# Patient Record
Sex: FEMALE | Race: WHITE | NOT HISPANIC OR LATINO | Employment: OTHER | URBAN - METROPOLITAN AREA
[De-identification: names, ages, dates, MRNs, and addresses within clinical notes are randomized per-mention and may not be internally consistent; named-entity substitution may affect disease eponyms.]

---

## 2017-04-10 ENCOUNTER — OFFICE VISIT (OUTPATIENT)
Dept: INTERNAL MEDICINE | Facility: CLINIC | Age: 67
End: 2017-04-10

## 2017-04-10 VITALS
HEART RATE: 79 BPM | TEMPERATURE: 98.9 F | HEIGHT: 64 IN | OXYGEN SATURATION: 97 % | WEIGHT: 117.4 LBS | DIASTOLIC BLOOD PRESSURE: 88 MMHG | SYSTOLIC BLOOD PRESSURE: 142 MMHG | BODY MASS INDEX: 20.04 KG/M2

## 2017-04-10 DIAGNOSIS — J20.9 ACUTE BRONCHITIS, UNSPECIFIED ORGANISM: Primary | ICD-10-CM

## 2017-04-10 PROBLEM — M70.60 TROCHANTERIC BURSITIS: Status: ACTIVE | Noted: 2017-02-17

## 2017-04-10 PROCEDURE — 99213 OFFICE O/P EST LOW 20 MIN: CPT | Performed by: INTERNAL MEDICINE

## 2017-04-10 RX ORDER — DOXYCYCLINE HYCLATE 100 MG/1
100 TABLET, DELAYED RELEASE ORAL 2 TIMES DAILY
Qty: 14 TABLET | Refills: 0 | Status: SHIPPED | OUTPATIENT
Start: 2017-04-10 | End: 2017-04-17

## 2017-04-10 RX ORDER — BENZONATATE 100 MG/1
100 CAPSULE ORAL 3 TIMES DAILY PRN
Qty: 30 CAPSULE | Refills: 0 | Status: SHIPPED | OUTPATIENT
Start: 2017-04-10 | End: 2017-04-17

## 2017-04-10 NOTE — PROGRESS NOTES
"Subjective     Palak Berumen is a 66 y.o. female, who presents with a chief complaint of   Chief Complaint   Patient presents with   • Nasal Congestion     all sympt thursday, worried may turn into pna    • Cough     green mucus    • Headache     \"slight\"   • Fever     \"low grade\" over the weekend        HPI Comments: Patient is here with 4 days of congestion and cough that is green. She does not have SOB or CP. She has been taking Mucinex during the day and then NiQuil Cold and Sinus at night. She had had subjective fever, but nothing documented. She had been slightly fatigued, but is still working. The treatment that she has tried has worked well and seems to be helping with cough.        The following portions of the patient's history were reviewed and updated as appropriate: allergies, current medications, past family history, past medical history, past social history, past surgical history and problem list.    Allergies: Review of patient's allergies indicates no known allergies.    Current Outpatient Prescriptions:   •  Ascorbic Acid (VITAMIN C) 100 MG tablet, Take 100 mg by mouth daily., Disp: , Rfl:   •  Calcium Carbonate-Vitamin D (CALCIUM-VITAMIN D) 600-200 MG-UNIT capsule, Take by mouth., Disp: , Rfl:   •  cetirizine (ZyrTEC) 10 MG tablet, Take 10 mg by mouth daily., Disp: , Rfl:   •  GuaiFENesin (MUCUS RELIEF ADULT PO), Take  by mouth., Disp: , Rfl:   •  Multiple Vitamin (MULTIVITAMIN) tablet, Take 1 tablet by mouth daily., Disp: , Rfl:   •  Pseudoeph-Doxylamine-DM-APAP (NYQUIL PO), Take  by mouth., Disp: , Rfl:   •  raloxifene (EVISTA) 60 MG tablet, Take 1 tablet (60 mg total) by mouth daily. Take one tablet daily as directed, Disp: 30 tablet, Rfl: 12  •  ZOLMitriptan (ZOMIG) 5 MG tablet, Take one tablet po at onset of migraine.  May repeat once after 2 hours.  Max 10mg/day, Disp: 12 tablet, Rfl: 6  •  benzonatate (TESSALON PERLES) 100 MG capsule, Take 1 capsule by mouth 3 (Three) Times a Day As " "Needed for Cough for up to 7 days., Disp: 30 capsule, Rfl: 0  •  doxycycline (DORYX) 100 MG enteric coated tablet, Take 1 tablet by mouth 2 (Two) Times a Day for 7 days., Disp: 14 tablet, Rfl: 0  There are no discontinued medications.    Review of Systems   Constitutional: Positive for chills, fatigue and fever.   HENT: Positive for congestion.    Respiratory: Positive for cough. Negative for shortness of breath and wheezing.    Cardiovascular: Negative for chest pain and palpitations.   Gastrointestinal: Negative for diarrhea and nausea.       Objective     /88 (BP Location: Left arm, Patient Position: Sitting, Cuff Size: Adult)  Pulse 79  Temp 98.9 °F (37.2 °C) (Oral)   Ht 64\" (162.6 cm)  Wt 117 lb 6.4 oz (53.3 kg)  SpO2 97%  BMI 20.15 kg/m2      Physical Exam   Constitutional: She is oriented to person, place, and time. She appears well-developed and well-nourished. No distress.   HENT:   Head: Normocephalic and atraumatic.   Right Ear: Tympanic membrane and external ear normal.   Left Ear: Tympanic membrane and external ear normal.   Mouth/Throat: Oropharynx is clear and moist and mucous membranes are normal. No oropharyngeal exudate. Tonsils are 0 on the right. Tonsils are 0 on the left. No tonsillar exudate.   Eyes: Conjunctivae are normal. Right eye exhibits no discharge. Left eye exhibits no discharge. No scleral icterus.   Neck: Neck supple.   Cardiovascular: Normal rate, regular rhythm and normal heart sounds.  Exam reveals no gallop and no friction rub.    No murmur heard.  Pulmonary/Chest: Effort normal and breath sounds normal. No respiratory distress. She has no wheezes. She has no rales.   Lymphadenopathy:     She has no cervical adenopathy.   Neurological: She is alert and oriented to person, place, and time.   Skin: Skin is warm. No rash noted.   Psychiatric: She has a normal mood and affect. Her behavior is normal.   Nursing note and vitals reviewed.      Results for orders placed or " "performed during the hospital encounter of 10/17/16   Tissue Exam   Result Value Ref Range    Case Report       Surgical Pathology Report                         Case: [id number]                                  Authorizing Provider:  Anny Salamanca MD          Collected:           10/17/2016 10:21 AM          Ordering Location:     [facility]  Received:            10/17/2016 11:32 AM                                 ENDO SUITES                                                                  Pathologist:           Levy Peck MD                                                          Specimen:    Large Intestine, Right / Ascending Colon, ASCENDING COLON POLYP                            Final Diagnosis       COLON, ASCENDING, POLYPECTOMY:         TUBULAR ADENOMA.         ONLY LOW-GRADE DYSPLASIA IS IDENTIFIED.    TDJ/jse     CPT CODES:  1: 52865      Gross Description       Received in formalin labeled \"large intestine, right/ascending colon polyp\" is a 0.5 x 0.2 x 0.1 cm tan piece of tissue.  Submitted all in block 1A.     CC/USO/THM/jse       Microscopic Description       Performed, incorporated in diagnosis.       Embedded Images         Assessment/Plan   Palak was seen today for nasal congestion, cough, headache and fever.    Diagnoses and all orders for this visit:    Acute bronchitis, unspecified organism    Other orders  -     doxycycline (DORYX) 100 MG enteric coated tablet; Take 1 tablet by mouth 2 (Two) Times a Day for 7 days.  -     benzonatate (TESSALON PERLES) 100 MG capsule; Take 1 capsule by mouth 3 (Three) Times a Day As Needed for Cough for up to 7 days.    Will treat with doxycyline and tessalon pearls and will continue Mucinex during day for secretions.  Will return if symptoms worsen or has development of fever, chills., SOB or increased work of breathing.       Return if symptoms worsen or fail to improve.    Kait Marino MD  04/10/2017    "

## 2017-05-17 DIAGNOSIS — Z00.00 HEALTHCARE MAINTENANCE: Primary | ICD-10-CM

## 2017-06-07 ENCOUNTER — OFFICE VISIT (OUTPATIENT)
Dept: INTERNAL MEDICINE | Facility: CLINIC | Age: 67
End: 2017-06-07

## 2017-06-07 VITALS
WEIGHT: 116.2 LBS | HEART RATE: 61 BPM | OXYGEN SATURATION: 98 % | BODY MASS INDEX: 19.84 KG/M2 | HEIGHT: 64 IN | DIASTOLIC BLOOD PRESSURE: 60 MMHG | SYSTOLIC BLOOD PRESSURE: 122 MMHG

## 2017-06-07 DIAGNOSIS — Z00.00 PHYSICAL EXAM: Primary | ICD-10-CM

## 2017-06-07 LAB
BILIRUB BLD-MCNC: NEGATIVE MG/DL
CLARITY, POC: CLEAR
COLOR UR: YELLOW
GLUCOSE UR STRIP-MCNC: NEGATIVE MG/DL
KETONES UR QL: NEGATIVE
LEUKOCYTE EST, POC: NEGATIVE
NITRITE UR-MCNC: NEGATIVE MG/ML
PH UR: 7.5 [PH] (ref 5–8)
PROT UR STRIP-MCNC: NEGATIVE MG/DL
RBC # UR STRIP: NEGATIVE /UL
SP GR UR: 1.01 (ref 1–1.03)
UROBILINOGEN UR QL: NORMAL

## 2017-06-07 PROCEDURE — 81003 URINALYSIS AUTO W/O SCOPE: CPT | Performed by: INTERNAL MEDICINE

## 2017-06-07 PROCEDURE — 93000 ELECTROCARDIOGRAM COMPLETE: CPT | Performed by: INTERNAL MEDICINE

## 2017-06-07 PROCEDURE — 99397 PER PM REEVAL EST PAT 65+ YR: CPT | Performed by: INTERNAL MEDICINE

## 2017-06-07 NOTE — PROGRESS NOTES
"Subjective   Palak Berumen is a 67 y.o. female.     History of Present Illness   68 yo female with pmhx migraine, only having periodic migraines, using zomig.  At her baseline.  She is feeling pretty well, only occasional zyrtec.  Using nasal washer for her sinuses.    Has known osteoporosis. Using her calcium and vitamin D. Walking regularly. Not taking evista.     Did have ortho appt 2/17 - had trochanteric bursitis.  The following portions of the patient's history were reviewed and updated as appropriate: allergies, current medications, past family history, past medical history, past social history, past surgical history and problem list.    Review of Systems   Constitutional: Negative.  Negative for fatigue and fever.   HENT: Negative for congestion, postnasal drip, rhinorrhea, sore throat, trouble swallowing and voice change.         Eye exam last year.  Will arrange   Respiratory: Negative.  Negative for cough.    Cardiovascular: Negative.    Gastrointestinal: Negative for blood in stool, constipation and diarrhea.        Regular stools, colonoscopy every 2-3 years   Endocrine: Negative.    Genitourinary: Negative.    Musculoskeletal: Negative.    Neurological: Positive for headaches.        At her baseline   All other systems reviewed and are negative.      Objective   Physical Exam  /60 (BP Location: Right arm, Patient Position: Sitting, Cuff Size: Adult)  Pulse 61  Ht 64\" (162.6 cm)  Wt 116 lb 3.2 oz (52.7 kg)  SpO2 98%  BMI 19.95 kg/m2    General Appearance:  Alert, cooperative, no distress, appears stated age   Head:  Normocephalic, without obvious abnormality, atraumatic   Eyes:  PERRL, conjunctiva/corneas clear, EOM's intact, fundi benign, both eyes   Ears:  Normal TM's and external ear canals, both ears   Nose: Nares normal, septum midline,mucosa normal, no drainage or sinus tenderness   Throat: Lips, mucosa, and tongue normal; teeth and gums normal   Neck: Supple, symmetrical, trachea " midline, no adenopathy;  thyroid: not enlarged, symmetric, no tenderness/mass/nodules; no carotid bruit or JVD   Back:   Symmetric, no curvature, ROM normal, no CVA tenderness   Lungs:   Clear to auscultation bilaterally, respirations unlabored   Breasts:  No masses or tenderness   Heart:  Regular rate and rhythm, S1 and S2 normal, no murmur, rub, or gallop   Abdomen:   Soft, non-tender, bowel sounds active all four quadrants,  no masses, no organomegaly   Pelvic: Deferred   Extremities: Extremities normal, atraumatic, no cyanosis or edema   Pulses: 2+ and symmetric   Skin: Skin color, texture, turgor normal, no rashes or lesions   Lymph nodes: Cervical, supraclavicular, and axillary nodes normal   Neurologic: Normal     ECG 12 Lead  Date/Time: 6/7/2017 1:14 PM  Performed by: RADHA TREVINO  Authorized by: RADHA TREVINO   Rhythm: sinus rhythm  Rate: normal  BPM: 60  ST Segments: ST segments normal  T Waves: T waves normal  QRS axis: normal  Other: no other findings  Clinical impression: normal ECG        UA clear today      Assessment/Plan   Palak was seen today for annual exam.    Diagnoses and all orders for this visit:    Physical exam  -     POCT urinalysis dipstick, automated  -     ECG 12 Lead  -     ECG 12 Lead        Labs done at [facility], call once get results.   Recommend glaucoma screening annually  GYN for dexa  S/p mastectomy - oncology at [place]   Colonoscopy with Dr. Salamanca - avoiding red meats  Continue healthy diet and exercise  Recommend prevnar at some point. She declines today  Shingles up to date

## 2017-12-13 DIAGNOSIS — G43.009 MIGRAINE WITHOUT AURA AND WITHOUT STATUS MIGRAINOSUS, NOT INTRACTABLE: ICD-10-CM

## 2017-12-13 RX ORDER — ZOLMITRIPTAN 5 MG/1
TABLET, FILM COATED ORAL
Qty: 12 TABLET | Refills: 5 | Status: SHIPPED | OUTPATIENT
Start: 2017-12-13 | End: 2018-08-25 | Stop reason: SDUPTHER

## 2018-01-26 ENCOUNTER — OFFICE VISIT (OUTPATIENT)
Dept: INTERNAL MEDICINE | Facility: CLINIC | Age: 68
End: 2018-01-26

## 2018-01-26 VITALS
HEIGHT: 64 IN | BODY MASS INDEX: 20.14 KG/M2 | DIASTOLIC BLOOD PRESSURE: 76 MMHG | WEIGHT: 118 LBS | SYSTOLIC BLOOD PRESSURE: 140 MMHG | HEART RATE: 83 BPM | OXYGEN SATURATION: 98 % | TEMPERATURE: 97.8 F

## 2018-01-26 DIAGNOSIS — J20.9 ACUTE BRONCHITIS, UNSPECIFIED ORGANISM: Primary | ICD-10-CM

## 2018-01-26 DIAGNOSIS — R09.81 NASAL CONGESTION: ICD-10-CM

## 2018-01-26 LAB
EXPIRATION DATE: NORMAL
FLUAV AG NPH QL: NORMAL
FLUBV AG NPH QL: NORMAL
INTERNAL CONTROL: NORMAL
Lab: NORMAL

## 2018-01-26 PROCEDURE — 99213 OFFICE O/P EST LOW 20 MIN: CPT | Performed by: INTERNAL MEDICINE

## 2018-01-26 PROCEDURE — 87804 INFLUENZA ASSAY W/OPTIC: CPT | Performed by: INTERNAL MEDICINE

## 2018-01-26 RX ORDER — AZITHROMYCIN 250 MG/1
TABLET, FILM COATED ORAL
Qty: 6 TABLET | Refills: 0 | Status: SHIPPED | OUTPATIENT
Start: 2018-01-26 | End: 2018-06-13

## 2018-01-26 NOTE — PROGRESS NOTES
Subjective     Palak Berumen is a 67 y.o. female, who presents with a chief complaint of   Chief Complaint   Patient presents with   • Nasal Congestion     all symptoms 5 x days    • Cough       HPI Comments: 66 yo F here with 5 days of chest tightness and cough. Cough is dry. No fever. Nasal congestion just started today. Scratchy throat that started today. Ear are feeling fine. Sick contacts at home. She has taken Mucinex that has helped.        The following portions of the patient's history were reviewed and updated as appropriate: allergies, current medications, past family history, past medical history, past social history, past surgical history and problem list.    Allergies: Review of patient's allergies indicates no known allergies.    Current Outpatient Prescriptions:   •  Ascorbic Acid (VITAMIN C) 100 MG tablet, Take 100 mg by mouth daily., Disp: , Rfl:   •  Calcium Carbonate-Vitamin D (CALCIUM-VITAMIN D) 600-200 MG-UNIT capsule, Take by mouth., Disp: , Rfl:   •  cetirizine (ZyrTEC) 10 MG tablet, Take 10 mg by mouth daily., Disp: , Rfl:   •  Fluticasone Furoate-Vilanterol 100-25 MCG/INH aerosol powder , Inhale 1 puff Daily., Disp: , Rfl:   •  GuaiFENesin (MUCUS RELIEF ADULT PO), Take  by mouth., Disp: , Rfl:   •  Multiple Vitamin (MULTIVITAMIN) tablet, Take 1 tablet by mouth daily., Disp: , Rfl:   •  Pseudoeph-Doxylamine-DM-APAP (NYQUIL PO), Take  by mouth., Disp: , Rfl:   •  ZOLMitriptan (ZOMIG) 5 MG tablet, TAKE ONE TABLET BY MOUTH  AT ONSET OF MIGRAINE MAY REPEAT ONCE AFTER 2 HOURS MAX 2 TABLETS (10 MG), Disp: 12 tablet, Rfl: 5  •  azithromycin (ZITHROMAX Z-PARTH) 250 MG tablet, Take 2 tablets the first day, then 1 tablet daily for 4 days., Disp: 6 tablet, Rfl: 0  There are no discontinued medications.    Review of Systems   Constitutional: Positive for chills and fatigue. Negative for fever.   HENT: Positive for congestion.    Respiratory: Positive for cough. Negative for shortness of breath and  "wheezing.    Neurological: Negative for dizziness and headaches.       Objective     /76 (BP Location: Right arm, Patient Position: Sitting, Cuff Size: Adult)  Pulse 83  Temp 97.8 °F (36.6 °C) (Oral)   Ht 162.6 cm (64.02\")  Wt 53.5 kg (118 lb)  SpO2 98%  BMI 20.24 kg/m2      Physical Exam   Constitutional: She is oriented to person, place, and time. She appears well-developed and well-nourished. No distress.   HENT:   Head: Normocephalic and atraumatic.   Right Ear: Hearing, tympanic membrane and external ear normal.   Left Ear: Hearing, tympanic membrane and external ear normal.   Nose: Nose normal. Right sinus exhibits no frontal sinus tenderness. Left sinus exhibits no frontal sinus tenderness.   Mouth/Throat: Uvula is midline and mucous membranes are normal. Posterior oropharyngeal edema and posterior oropharyngeal erythema present. No oropharyngeal exudate. Tonsils are 0 on the right. Tonsils are 0 on the left. No tonsillar exudate.   Eyes: Conjunctivae are normal. Right eye exhibits no discharge. Left eye exhibits no discharge. No scleral icterus.   Neck: Neck supple.   Cardiovascular: Normal rate, regular rhythm and normal heart sounds.  Exam reveals no gallop and no friction rub.    No murmur heard.  Pulmonary/Chest: Effort normal and breath sounds normal. No respiratory distress. She has no wheezes. She has no rales.   Lymphadenopathy:     She has no cervical adenopathy.   Neurological: She is alert and oriented to person, place, and time.   Skin: Skin is warm. No rash noted.   Psychiatric: She has a normal mood and affect. Her behavior is normal.   Nursing note and vitals reviewed.      Results for orders placed or performed in visit on 01/26/18   POC Influenza A / B   Result Value Ref Range    Rapid Influenza A Ag neg     Rapid Influenza B Ag neg     Internal Control Passed Passed    Lot Number [id number]     Expiration Date 12/06/2020        Assessment/Plan   Palak was seen today for nasal " congestion and cough.    Diagnoses and all orders for this visit:    Acute bronchitis, unspecified organism    Nasal congestion  -     POC Influenza A / B    Other orders  -     azithromycin (ZITHROMAX Z-PARTH) 250 MG tablet; Take 2 tablets the first day, then 1 tablet daily for 4 days.      Breo as well as Z-pack for cough   Mucinex for secretions   Return if fever, lethargy or worsening symptoms  Return if symptoms worsen or fail to improve.    Kait Marino MD  01/26/2018

## 2018-05-21 DIAGNOSIS — M81.0 OSTEOPOROSIS, UNSPECIFIED OSTEOPOROSIS TYPE, UNSPECIFIED PATHOLOGICAL FRACTURE PRESENCE: Primary | ICD-10-CM

## 2018-05-21 DIAGNOSIS — Z00.00 ROUTINE ADULT HEALTH MAINTENANCE: ICD-10-CM

## 2018-05-21 DIAGNOSIS — Z00.00 PHYSICAL EXAM: ICD-10-CM

## 2018-05-26 ENCOUNTER — RESULTS ENCOUNTER (OUTPATIENT)
Dept: INTERNAL MEDICINE | Facility: CLINIC | Age: 68
End: 2018-05-26

## 2018-05-26 DIAGNOSIS — M81.0 OSTEOPOROSIS, UNSPECIFIED OSTEOPOROSIS TYPE, UNSPECIFIED PATHOLOGICAL FRACTURE PRESENCE: ICD-10-CM

## 2018-05-26 DIAGNOSIS — Z00.00 PHYSICAL EXAM: ICD-10-CM

## 2018-05-26 DIAGNOSIS — Z00.00 ROUTINE ADULT HEALTH MAINTENANCE: ICD-10-CM

## 2018-06-11 ENCOUNTER — TELEPHONE (OUTPATIENT)
Dept: INTERNAL MEDICINE | Facility: CLINIC | Age: 68
End: 2018-06-11

## 2018-06-11 NOTE — TELEPHONE ENCOUNTER
Patient has appt on 6/13/18 to review with dr. constantino     ----- Message from Yoshi Constantino MD sent at 6/7/2018  2:27 PM EDT -----  Been over a year since been seen.  Have [facility] here. She would need to come in for review.

## 2018-06-13 ENCOUNTER — OFFICE VISIT (OUTPATIENT)
Dept: INTERNAL MEDICINE | Facility: CLINIC | Age: 68
End: 2018-06-13

## 2018-06-13 VITALS
HEART RATE: 79 BPM | SYSTOLIC BLOOD PRESSURE: 134 MMHG | HEIGHT: 64 IN | BODY MASS INDEX: 20.11 KG/M2 | WEIGHT: 117.8 LBS | RESPIRATION RATE: 14 BRPM | OXYGEN SATURATION: 99 % | DIASTOLIC BLOOD PRESSURE: 74 MMHG

## 2018-06-13 DIAGNOSIS — Z00.00 ROUTINE ADULT HEALTH MAINTENANCE: Primary | ICD-10-CM

## 2018-06-13 LAB
BILIRUB BLD-MCNC: NEGATIVE MG/DL
CLARITY, POC: CLEAR
COLOR UR: YELLOW
GLUCOSE UR STRIP-MCNC: NEGATIVE MG/DL
KETONES UR QL: NEGATIVE
LEUKOCYTE EST, POC: NEGATIVE
NITRITE UR-MCNC: NEGATIVE MG/ML
PH UR: 7 [PH] (ref 5–8)
PROT UR STRIP-MCNC: NEGATIVE MG/DL
RBC # UR STRIP: NEGATIVE /UL
SP GR UR: 1.01 (ref 1–1.03)
UROBILINOGEN UR QL: NORMAL

## 2018-06-13 PROCEDURE — 99397 PER PM REEVAL EST PAT 65+ YR: CPT | Performed by: INTERNAL MEDICINE

## 2018-06-13 PROCEDURE — 81003 URINALYSIS AUTO W/O SCOPE: CPT | Performed by: INTERNAL MEDICINE

## 2018-06-13 NOTE — PROGRESS NOTES
Palak Berumen is a 68 y.o. female, who presents with a chief complaint of   Chief Complaint   Patient presents with   • Annual Exam       HPI   69 yo female with pmhx mastectomy   The following portions of the patient's history were reviewed and updated as appropriate: allergies, current medications, past family history, past medical history, past social history, past surgical history and problem list.    Allergies: Patient has no known allergies.    Current Outpatient Prescriptions:   •  Ascorbic Acid (VITAMIN C) 100 MG tablet, Take 100 mg by mouth daily., Disp: , Rfl:   •  Calcium Carbonate-Vitamin D (CALCIUM-VITAMIN D) 600-200 MG-UNIT capsule, Take by mouth., Disp: , Rfl:   •  GuaiFENesin (MUCUS RELIEF ADULT PO), Take  by mouth., Disp: , Rfl:   •  Multiple Vitamin (MULTIVITAMIN) tablet, Take 1 tablet by mouth daily., Disp: , Rfl:   •  ZOLMitriptan (ZOMIG) 5 MG tablet, TAKE ONE TABLET BY MOUTH  AT ONSET OF MIGRAINE MAY REPEAT ONCE AFTER 2 HOURS MAX 2 TABLETS (10 MG), Disp: 12 tablet, Rfl: 5  Medications Discontinued During This Encounter   Medication Reason   • azithromycin (ZITHROMAX Z-PARTH) 250 MG tablet *Therapy completed   • Pseudoeph-Doxylamine-DM-APAP (NYQUIL PO) *Therapy completed   • cetirizine (ZyrTEC) 10 MG tablet *Therapy completed   • Fluticasone Furoate-Vilanterol 100-25 MCG/INH aerosol powder  *Therapy completed       Review of Systems   Constitutional: Negative for appetite change, fatigue, fever and unexpected weight change.   HENT: Negative for sinus pressure and trouble swallowing.    Respiratory: Negative.  Negative for cough and chest tightness.    Cardiovascular: Negative for chest pain, palpitations and leg swelling.   Gastrointestinal: Negative for constipation and diarrhea.   Genitourinary: Negative.  Negative for dysuria, frequency, hematuria, pelvic pain and vaginal discharge.   Musculoskeletal: Negative.    Skin: Negative.    Neurological: Positive for headaches. Negative for  "dizziness and light-headedness.        More than 5 per month.    Hematological: Negative.    Psychiatric/Behavioral: Negative.    All other systems reviewed and are negative.            /74   Pulse 79   Resp 14   Ht 162.6 cm (64\")   Wt 53.4 kg (117 lb 12.8 oz)   SpO2 99%   BMI 20.22 kg/m²       Physical Exam   Constitutional: She is oriented to person, place, and time. She appears well-developed and well-nourished.   HENT:   Head: Normocephalic and atraumatic.   Right Ear: External ear normal.   Left Ear: External ear normal.   Eyes: EOM are normal. Pupils are equal, round, and reactive to light.   Neck: Normal range of motion. Neck supple. No tracheal deviation present. No thyromegaly present.   Cardiovascular: Normal rate, regular rhythm and normal heart sounds.  Exam reveals no friction rub.    No murmur heard.  Pulmonary/Chest: Effort normal and breath sounds normal.   Neurological: She is alert and oriented to person, place, and time.   Skin: Skin is warm and dry.   Psychiatric: She has a normal mood and affect. Her behavior is normal.   Vitals reviewed.      Lab Results (most recent)     Procedure Component Value Units Date/Time    POC Urinalysis Dipstick, Automated [[id number]] Collected:  06/13/18 0906    Specimen:  Urine Updated:  06/13/18 0906     Color Yellow     Clarity, UA Clear     Specific Gravity  1.010     pH, Urine 7.0     Leukocytes Negative     Nitrite, UA Negative     Protein, POC Negative mg/dL      Glucose, UA Negative mg/dL      Ketones, UA Negative     Urobilinogen, UA Normal     Bilirubin Negative     Blood, UA Negative          Results for orders placed or performed in visit on 06/13/18   POC Urinalysis Dipstick, Automated   Result Value Ref Range    Color Yellow Yellow, Straw, Dark Yellow, Marci    Clarity, UA Clear Clear    Specific Gravity  1.010 1.005 - 1.030    pH, Urine 7.0 5.0 - 8.0    Leukocytes Negative Negative    Nitrite, UA Negative Negative    Protein, POC Negative " Negative mg/dL    Glucose, UA Negative Negative, 1000 mg/dL (3+) mg/dL    Ketones, UA Negative Negative    Urobilinogen, UA Normal Normal    Bilirubin Negative Negative    Blood, UA Negative Negative           Palka was seen today for annual exam.    Diagnoses and all orders for this visit:    Routine adult health maintenance  -     POC Urinalysis Dipstick, Automated  -     Comprehensive metabolic panel  -     Lipid Panel w/ Chol/HDL Ratio  -     Hemoglobin A1c  -     TSH  -     CBC w AUTO Differential  -     Hepatitis C antibody    Labs done at [facility] and review with patient, add hep c antigen next time she does labs. I did not realize she had [facility] labs already.    Colonic polyps. Last colonoscopy 10/2016.  Patient remembers endoscopy every 2 years. She prefers to have more frequent scopes.   GYn for dexa, discussed osteoporosis treatment, consider evista if an option with her.   CLL stable, followed with Dr. Zamora.   Migraines are uncontrolled, using zomig regularly more than 5 per month. Discussed her goal is <2 per month. Offered losartan or propranolol  Recommend flonase daily and daily allegra.     Return in about 1 year (around 6/13/2019).    Yoshi Constantino MD  06/13/2018

## 2018-06-13 NOTE — PATIENT INSTRUCTIONS
Palak was seen today for annual exam.    Diagnoses and all orders for this visit:    Routine adult health maintenance  -     POC Urinalysis Dipstick, Automated  -     Comprehensive metabolic panel  -     Lipid Panel w/ Chol/HDL Ratio  -     Hemoglobin A1c  -     TSH  -     CBC w AUTO Differential  -     Hepatitis C antibody    Labs done at [facility] and review with patient, add hep c antigen next time she does labs. I did not realize she had [facility] labs already.    Colonic polyps. Last colonoscopy 10/2016.  Patient remembers endoscopy every 2 years. She prefers to have more frequent scopes.   GYn for dexa, discussed osteoporosis treatment, consider evista if an option with her.   CLL stable, followed with Dr. Zamora.   Migraines are uncontrolled, using zomig regularly more than 5 per month. Discussed her goal is <2 per month. Offered losartan or propranolol  Recommend flonase daily and daily allegra.     Return in about 1 year (around 6/13/2019).    Yoshi Constantino MD  06/13/2018

## 2018-08-25 DIAGNOSIS — G43.009 MIGRAINE WITHOUT AURA AND WITHOUT STATUS MIGRAINOSUS, NOT INTRACTABLE: ICD-10-CM

## 2018-08-27 RX ORDER — ZOLMITRIPTAN 5 MG/1
TABLET, FILM COATED ORAL
Qty: 12 TABLET | Refills: 4 | Status: SHIPPED | OUTPATIENT
Start: 2018-08-27 | End: 2019-06-20 | Stop reason: SDUPTHER

## 2019-06-04 ENCOUNTER — TELEPHONE (OUTPATIENT)
Dept: INTERNAL MEDICINE | Facility: CLINIC | Age: 69
End: 2019-06-04

## 2019-06-04 DIAGNOSIS — Z11.59 NEED FOR HEPATITIS C SCREENING TEST: ICD-10-CM

## 2019-06-04 DIAGNOSIS — Z00.00 ROUTINE ADULT HEALTH MAINTENANCE: Primary | ICD-10-CM

## 2019-06-04 DIAGNOSIS — C91.10 CHRONIC LYMPHOCYTIC LEUKEMIA (HCC): ICD-10-CM

## 2019-06-04 NOTE — TELEPHONE ENCOUNTER
I left a voice mail for the patient to let her know that the lab orders are up front in the file for her to .      ----- Message from Dawn Callejas CMA sent at 6/4/2019  2:31 PM EDT -----  Regarding: RE: LAB ORDERS PLEASE  Contact: [phone]  Ready to print out.   Please print for patient and tell her they are ready.     ----- Message -----  From: Kait Marino MD  Sent: 6/4/2019   9:37 AM  To: Dawn Callejas CMA  Subject: RE: LAB ORDERS PLEASE                            CBC, CMP, TSH with reflux, Hep C ab, lipid, urine, and vitamin D   ----- Message -----  From: Dawn Callejas CMA  Sent: 6/3/2019   2:10 PM  To: Kait Marino MD  Subject: RE: LAB ORDERS PLEASE                            [facility] transfer, what labs would you like for her to have?   Thank you     ----- Message -----  From: Marielena Shook MA  Sent: 5/29/2019   4:12 PM  To: Dawn Callejas CMA  Subject: FW: LAB ORDERS PLEASE                                ----- Message -----  From: Iris Berrios  Sent: 5/29/2019   2:40 PM  To: Jayden Patel [facility]  Subject: LAB ORDERS PLEASE                                [facility] PT / prev trinity    Patient called to say that she needs to have her lab orders printed so that she can pick them up to take to the lab where she has to have her blood drawn.  Please call her when these are ready for her to .  She has an appointment scheduled for 06/20    Thanks!  Iris

## 2019-06-20 ENCOUNTER — OFFICE VISIT (OUTPATIENT)
Dept: INTERNAL MEDICINE | Facility: CLINIC | Age: 69
End: 2019-06-20

## 2019-06-20 VITALS
OXYGEN SATURATION: 98 % | DIASTOLIC BLOOD PRESSURE: 68 MMHG | WEIGHT: 113 LBS | BODY MASS INDEX: 19.29 KG/M2 | TEMPERATURE: 98.2 F | RESPIRATION RATE: 14 BRPM | HEART RATE: 76 BPM | HEIGHT: 64 IN | SYSTOLIC BLOOD PRESSURE: 140 MMHG

## 2019-06-20 DIAGNOSIS — K63.5 POLYP OF COLON, UNSPECIFIED PART OF COLON, UNSPECIFIED TYPE: ICD-10-CM

## 2019-06-20 DIAGNOSIS — C91.10 CHRONIC LYMPHOCYTIC LEUKEMIA (HCC): Primary | ICD-10-CM

## 2019-06-20 DIAGNOSIS — R03.0 ELEVATED BLOOD PRESSURE READING: ICD-10-CM

## 2019-06-20 DIAGNOSIS — E78.5 HYPERLIPIDEMIA, UNSPECIFIED HYPERLIPIDEMIA TYPE: ICD-10-CM

## 2019-06-20 DIAGNOSIS — G43.009 MIGRAINE WITHOUT AURA AND WITHOUT STATUS MIGRAINOSUS, NOT INTRACTABLE: ICD-10-CM

## 2019-06-20 DIAGNOSIS — G43.009 ATYPICAL MIGRAINE: ICD-10-CM

## 2019-06-20 PROBLEM — M70.60 TROCHANTERIC BURSITIS: Status: RESOLVED | Noted: 2017-02-17 | Resolved: 2019-06-20

## 2019-06-20 PROBLEM — Z00.00 ROUTINE ADULT HEALTH MAINTENANCE: Status: RESOLVED | Noted: 2017-06-07 | Resolved: 2019-06-20

## 2019-06-20 PROBLEM — Z00.00 PHYSICAL EXAM: Status: RESOLVED | Noted: 2017-06-07 | Resolved: 2019-06-20

## 2019-06-20 PROCEDURE — 99214 OFFICE O/P EST MOD 30 MIN: CPT | Performed by: INTERNAL MEDICINE

## 2019-06-20 RX ORDER — CLOBETASOL PROPIONATE 0.5 MG/G
OINTMENT TOPICAL
COMMUNITY
Start: 2019-04-09 | End: 2019-12-18

## 2019-06-20 RX ORDER — ZOLMITRIPTAN 5 MG/1
TABLET, FILM COATED ORAL
Qty: 12 TABLET | Refills: 4 | Status: SHIPPED | OUTPATIENT
Start: 2019-06-20 | End: 2020-08-24

## 2019-06-20 NOTE — PROGRESS NOTES
Palak Berumen is a 69 y.o. female, who presents with a chief complaint of   Chief Complaint   Patient presents with   • Follow-up     6 + x month f/u, lab results, [facility] transfer   • Leukemia       68 yo F here to establish care. All of her problems are new to me. She is seeing Dr. Fonseca's at [place] for CLL. Feeling well and is stable and just gets blood work checked once per year.     She has chronic migraines and seems to flair with weather changes. Allergies also trigger these. Takes Zomig and it makes it better. Takes about a few times per month.     She had double mastectomy 29 years ago and does not get mammograms due to this. Followed by Dr. Marycarmen Ortega.     I reviewed her blood work which all looks great. Her LDL was slightly elevated, but is not terrible. Eats well and exercises.     She had had elevated blood pressure readings in the past. I reviewed the last four in the system and all were high. States always high at doctor's office. No headache or dizziness. Never been on treatment.          The following portions of the patient's history were reviewed and updated as appropriate: allergies, current medications, past family history, past medical history, past social history, past surgical history and problem list.    Allergies: Patient has no known allergies.    Current Outpatient Medications:   •  Ascorbic Acid (VITAMIN C) 100 MG tablet, Take 100 mg by mouth daily., Disp: , Rfl:   •  Calcium Carbonate-Vitamin D (CALCIUM-VITAMIN D) 600-200 MG-UNIT capsule, Take by mouth., Disp: , Rfl:   •  clobetasol (TEMOVATE) 0.05 % ointment, , Disp: , Rfl:   •  GuaiFENesin (MUCUS RELIEF ADULT PO), Take  by mouth., Disp: , Rfl:   •  Multiple Vitamin (MULTIVITAMIN) tablet, Take 1 tablet by mouth daily., Disp: , Rfl:   •  ZOLMitriptan (ZOMIG) 5 MG tablet, Take one tablet by mouth at onset of migraine and may repeat once after 2 hours if needed (max dose 10mg), Disp: 12 tablet, Rfl: 4  Medications  "Discontinued During This Encounter   Medication Reason   • ZOLMitriptan (ZOMIG) 5 MG tablet Reorder       Review of Systems   Constitutional: Negative for chills, fatigue and fever.   HENT: Negative for congestion and rhinorrhea.    Respiratory: Negative for cough and shortness of breath.    Cardiovascular: Negative for chest pain and palpitations.   Gastrointestinal: Negative for abdominal pain, constipation and diarrhea.   Endocrine: Negative for cold intolerance and heat intolerance.   Genitourinary: Negative for difficulty urinating and dysuria.   Allergic/Immunologic: Negative for environmental allergies.   Neurological: Positive for headaches.             /68   Pulse 76   Temp 98.2 °F (36.8 °C) (Oral)   Resp 14   Ht 162.6 cm (64\")   Wt 51.3 kg (113 lb)   SpO2 98%   BMI 19.40 kg/m²       Physical Exam   Constitutional: She is oriented to person, place, and time. She appears well-developed and well-nourished. No distress.   HENT:   Head: Normocephalic and atraumatic.   Right Ear: Hearing, tympanic membrane, external ear and ear canal normal.   Left Ear: Hearing, tympanic membrane, external ear and ear canal normal.   Nose: Nose normal.   Mouth/Throat: Uvula is midline, oropharynx is clear and moist and mucous membranes are normal. No oropharyngeal exudate. Tonsils are 0 on the right. Tonsils are 0 on the left. No tonsillar exudate.   Eyes: Conjunctivae are normal. Right eye exhibits no discharge. Left eye exhibits no discharge. No scleral icterus.   Neck: Neck supple.   Cardiovascular: Normal rate, regular rhythm and normal heart sounds. Exam reveals no gallop and no friction rub.   No murmur heard.  Pulmonary/Chest: Effort normal and breath sounds normal. No respiratory distress. She has no wheezes. She has no rales.   Abdominal: Soft. Bowel sounds are normal. She exhibits no distension and no mass. There is no tenderness. There is no guarding.   Lymphadenopathy:     She has no cervical adenopathy. "   Neurological: She is alert and oriented to person, place, and time.   Skin: Skin is warm. No rash noted.   Psychiatric: She has a normal mood and affect. Her behavior is normal.   Nursing note and vitals reviewed.        Results for orders placed or performed in visit on 06/13/18   POC Urinalysis Dipstick, Automated   Result Value Ref Range    Color Yellow Yellow, Straw, Dark Yellow, Marci    Clarity, UA Clear Clear    Specific Gravity  1.010 1.005 - 1.030    pH, Urine 7.0 5.0 - 8.0    Leukocytes Negative Negative    Nitrite, UA Negative Negative    Protein, POC Negative Negative mg/dL    Glucose, UA Negative Negative, 1000 mg/dL (3+) mg/dL    Ketones, UA Negative Negative    Urobilinogen, UA Normal Normal    Bilirubin Negative Negative    Blood, UA Negative Negative           Palak was seen today for follow-up and leukemia.    Diagnoses and all orders for this visit:    Chronic lymphocytic leukemia (CMS/HCC)    Atypical migraine    Hyperlipidemia, unspecified hyperlipidemia type    Migraine without aura and without status migrainosus, not intractable  -     ZOLMitriptan (ZOMIG) 5 MG tablet; Take one tablet by mouth at onset of migraine and may repeat once after 2 hours if needed (max dose 10mg)    Polyp of colon, unspecified part of colon, unspecified type  -     Ambulatory Referral For Screening Colonoscopy    Elevated blood pressure reading      CLL is stable and reviewed labs. Keep follow up with Dr. Zamora at [place].     Zomig as needed for her headache.     We are going to keep watching her cholesterol. We are going to monitor. Keep eating well and exercising.     Due to see Dr. Salamanca for c-scope in the fall. Placed referral.     Check ambulatory pressures x 1 week and call me with results. If still high, may consider low dose ACE. May help with headaches.     Return in about 1 year (around 6/20/2020) for Recheck, Medicare Wellness.    Kait Marino MD  06/20/2019

## 2019-06-28 ENCOUNTER — TELEPHONE (OUTPATIENT)
Dept: INTERNAL MEDICINE | Facility: CLINIC | Age: 69
End: 2019-06-28

## 2019-06-28 NOTE — TELEPHONE ENCOUNTER
LVM with info for patient. Advised to return call if needed.     ----- Message from Kait Marino MD sent at 6/27/2019  2:20 PM EDT -----  Great BP's! Nothing further to do. BG was 101 or borderline elevated on her blood work. Nothing to do and does not need to have pancreas labs checked.     ----- Message -----  From: Dawn Callejas CMA  Sent: 6/26/2019   8:50 AM  To: Kait Marino MD    Patient called this AM and gave me her BP readings since 6/20.  Patient denies any worrisome sx.   6/20: 139/57  6/21: /59. /50  6/22: /55. /59  6/23: /54. /56  6/24: /50's. /55  6/25: /50s. /50's    I explained the difference between systolic and diastolic to patient.   -Patient is also asking about a lab test or a test for her pancrease? Patient states you were worried about her glucose being elevated. I was unsure what she was trying to explain!    Thank you!

## 2019-07-08 ENCOUNTER — PREP FOR SURGERY (OUTPATIENT)
Dept: OTHER | Facility: HOSPITAL | Age: 69
End: 2019-07-08

## 2019-07-08 DIAGNOSIS — C91.10 CLL (CHRONIC LYMPHOCYTIC LEUKEMIA) (HCC): ICD-10-CM

## 2019-07-08 DIAGNOSIS — Z86.010 HISTORY OF COLON POLYPS: ICD-10-CM

## 2019-07-08 DIAGNOSIS — Z80.0 FAMILY HISTORY OF COLON CANCER: ICD-10-CM

## 2019-07-08 DIAGNOSIS — Z85.3 HISTORY OF BREAST CANCER: Primary | ICD-10-CM

## 2019-07-22 PROBLEM — Z80.0 FAMILY HISTORY OF COLON CANCER: Status: ACTIVE | Noted: 2019-07-22

## 2019-07-22 PROBLEM — Z86.0100 HISTORY OF COLON POLYPS: Status: ACTIVE | Noted: 2019-07-22

## 2019-07-22 PROBLEM — Z85.3 HISTORY OF BREAST CANCER: Status: ACTIVE | Noted: 2019-07-22

## 2019-07-22 PROBLEM — Z86.010 HISTORY OF COLON POLYPS: Status: ACTIVE | Noted: 2019-07-22

## 2019-07-22 PROBLEM — C91.10 CLL (CHRONIC LYMPHOCYTIC LEUKEMIA) (HCC): Status: ACTIVE | Noted: 2019-07-22

## 2019-10-07 ENCOUNTER — ANESTHESIA (OUTPATIENT)
Dept: GASTROENTEROLOGY | Facility: HOSPITAL | Age: 69
End: 2019-10-07

## 2019-10-07 ENCOUNTER — HOSPITAL ENCOUNTER (OUTPATIENT)
Facility: HOSPITAL | Age: 69
Setting detail: HOSPITAL OUTPATIENT SURGERY
Discharge: HOME | End: 2019-10-07
Attending: SURGERY | Admitting: SURGERY

## 2019-10-07 ENCOUNTER — ANESTHESIA EVENT (OUTPATIENT)
Dept: GASTROENTEROLOGY | Facility: HOSPITAL | Age: 69
End: 2019-10-07

## 2019-10-07 VITALS
TEMPERATURE: 97.7 F | DIASTOLIC BLOOD PRESSURE: 70 MMHG | RESPIRATION RATE: 16 BRPM | HEART RATE: 73 BPM | WEIGHT: 108.5 LBS | HEIGHT: 64 IN | OXYGEN SATURATION: 99 % | BODY MASS INDEX: 18.52 KG/M2 | SYSTOLIC BLOOD PRESSURE: 129 MMHG

## 2019-10-07 PROCEDURE — 25010000002 PROPOFOL 10 MG/ML EMULSION: Performed by: ANESTHESIOLOGY

## 2019-10-07 PROCEDURE — G0105 COLORECTAL SCRN; HI RISK IND: HCPCS | Performed by: SURGERY

## 2019-10-07 PROCEDURE — 25010000002 GLUCAGON (RDNA) PER 1 MG: Performed by: SURGERY

## 2019-10-07 RX ORDER — LIDOCAINE HYDROCHLORIDE 20 MG/ML
INJECTION, SOLUTION INFILTRATION; PERINEURAL AS NEEDED
Status: DISCONTINUED | OUTPATIENT
Start: 2019-10-07 | End: 2019-10-07 | Stop reason: SURG

## 2019-10-07 RX ORDER — PROPOFOL 10 MG/ML
VIAL (ML) INTRAVENOUS AS NEEDED
Status: DISCONTINUED | OUTPATIENT
Start: 2019-10-07 | End: 2019-10-07 | Stop reason: SURG

## 2019-10-07 RX ORDER — PROPOFOL 10 MG/ML
VIAL (ML) INTRAVENOUS CONTINUOUS PRN
Status: DISCONTINUED | OUTPATIENT
Start: 2019-10-07 | End: 2019-10-07 | Stop reason: SURG

## 2019-10-07 RX ORDER — SODIUM CHLORIDE, SODIUM LACTATE, POTASSIUM CHLORIDE, CALCIUM CHLORIDE 600; 310; 30; 20 MG/100ML; MG/100ML; MG/100ML; MG/100ML
30 INJECTION, SOLUTION INTRAVENOUS CONTINUOUS PRN
Status: DISCONTINUED | OUTPATIENT
Start: 2019-10-07 | End: 2019-10-07 | Stop reason: HOSPADM

## 2019-10-07 RX ADMIN — LIDOCAINE HYDROCHLORIDE 60 MG: 20 INJECTION, SOLUTION INFILTRATION; PERINEURAL at 07:15

## 2019-10-07 RX ADMIN — PROPOFOL 100 MG: 10 INJECTION, EMULSION INTRAVENOUS at 07:10

## 2019-10-07 RX ADMIN — PROPOFOL 100 MCG/KG/MIN: 10 INJECTION, EMULSION INTRAVENOUS at 07:10

## 2019-10-07 RX ADMIN — SODIUM CHLORIDE, POTASSIUM CHLORIDE, SODIUM LACTATE AND CALCIUM CHLORIDE 30 ML/HR: 600; 310; 30; 20 INJECTION, SOLUTION INTRAVENOUS at 06:39

## 2019-10-07 NOTE — ANESTHESIA PREPROCEDURE EVALUATION
Anesthesia Evaluation     Patient summary reviewed and Nursing notes reviewed                Airway   Mallampati: I  TM distance: >3 FB  Neck ROM: full  No difficulty expected  Dental - normal exam     Pulmonary - negative pulmonary ROS and normal exam   Cardiovascular - normal exam    (+) hyperlipidemia,       Neuro/Psych  (+) headaches,     GI/Hepatic/Renal/Endo - negative ROS     Musculoskeletal (-) negative ROS    Abdominal  - normal exam    Bowel sounds: normal.   Substance History - negative use     OB/GYN negative ob/gyn ROS         Other   (+) blood dyscrasia   history of cancer                    Anesthesia Plan    ASA 3     MAC     Anesthetic plan, all risks, benefits, and alternatives have been provided, discussed and informed consent has been obtained with: patient.

## 2019-10-07 NOTE — DISCHARGE INSTRUCTIONS
Colonoscopy, Adult, Care After  This sheet gives you information about how to care for yourself after your procedure. Your health care provider may also give you more specific instructions. If you have problems or questions, contact your health care provider.  What can I expect after the procedure?  After the procedure, it is common to have:  · A small amount of blood in your stool for 24 hours after the procedure.  · Some gas.  · Mild abdominal cramping or bloating.  Follow these instructions at home:  General instructions  · For the first 24 hours after the procedure:  ? Do not drive or use machinery.  ? Do not sign important documents.  ? Do not drink alcohol.  ? Do your regular daily activities at a slower pace than normal.  ? Eat soft, easy-to-digest foods.  · Take over-the-counter or prescription medicines only as told by your health care provider.  Relieving cramping and bloating    · Try walking around when you have cramps or feel bloated.  · Apply heat to your abdomen as told by your health care provider. Use a heat source that your health care provider recommends, such as a moist heat pack or a heating pad.  ? Place a towel between your skin and the heat source.  ? Leave the heat on for 20-30 minutes.  ? Remove the heat if your skin turns bright red. This is especially important if you are unable to feel pain, heat, or cold. You may have a greater risk of getting burned.  Eating and drinking    · Drink enough fluid to keep your urine pale yellow.  · Resume your normal diet as instructed by your health care provider. Avoid heavy or fried foods that are hard to digest.  · Avoid drinking alcohol for as long as instructed by your health care provider.  Contact a health care provider if:  · You have blood in your stool 2-3 days after the procedure.  Get help right away if:  · You have more than a small spotting of blood in your stool.  · You pass large blood clots in your stool.  · Your abdomen is  swollen.  · You have nausea or vomiting.  · You have a fever.  · You have increasing abdominal pain that is not relieved with medicine.  Summary  · After the procedure, it is common to have a small amount of blood in your stool. You may also have mild abdominal cramping and bloating.  · For the first 24 hours after the procedure, do not drive or use machinery, sign important documents, or drink alcohol.  · Contact your health care provider if you have a lot of blood in your stool, nausea or vomiting, a fever, or increased abdominal pain.  This information is not intended to replace advice given to you by your health care provider. Make sure you discuss any questions you have with your health care provider.  Document Released: 08/01/2005 Document Revised: 10/10/2018 Document Reviewed: 02/28/2017  [employer] Interactive Patient Education © 2019 [employer]

## 2019-10-07 NOTE — ANESTHESIA POSTPROCEDURE EVALUATION
"Patient: Palak Berumen    Procedure Summary     Date:  10/07/19 Room / Location:  [facility] ENDOSCOPY 4 /  IVORY ENDOSCOPY    Anesthesia Start:  0703 Anesthesia Stop:  0733    Procedure:  COLONOSCOPY INTO CECUM AND TI (N/A ) Diagnosis:       History of breast cancer      CLL (chronic lymphocytic leukemia) (CMS/HCC)      Family history of colon cancer      History of colon polyps      (History of breast cancer [Z85.3])      (CLL (chronic lymphocytic leukemia) (CMS/HCC) [C91.90])      (Family history of colon cancer [Z80.0])      (History of colon polyps [Z86.010])    Surgeon:  Anny Salamanca MD Provider:  Sammy Sandoval MD    Anesthesia Type:  MAC ASA Status:  3          Anesthesia Type: MAC  Last vitals  BP   129/70 (10/07/19 0752)   Temp   36.5 °C (97.7 °F) (10/07/19 0730)   Pulse   73 (10/07/19 0752)   Resp   16 (10/07/19 0752)     SpO2   99 % (10/07/19 0752)     Post Anesthesia Care and Evaluation    Patient location during evaluation: PACU  Patient participation: complete - patient participated  Level of consciousness: awake  Pain score: 0  Pain management: adequate  Airway patency: patent  Anesthetic complications: No anesthetic complications  PONV Status: none  Cardiovascular status: acceptable  Respiratory status: acceptable  Hydration status: acceptable    Comments: /70   Pulse 73   Temp 36.5 °C (97.7 °F)   Resp 16   Ht 162.6 cm (64\")   Wt 49.2 kg (108 lb 8 oz)   SpO2 99%   BMI 18.62 kg/m²       "

## 2019-10-07 NOTE — H&P
Cc: Endoscopy Visit    HPI: 69 y.o. female here for screening with brother with colon cancer death at age 50.  She has had hyperplastic polyps in 2002 and 2013, but a tubular adenoma in 2016.  She also has a history of breast cancer and CLL.    Past Medical History:   Diagnosis Date   • Abnormal cervical Papanicolaou smear    • Allergic rhinitis    • Breast cancer (CMS/HCC)    • CLL (chronic lymphocytic leukemia) (CMS/HCC)     patient state stage zero, not currently treated (10/17/2017)   • H/O bone density study 10/27/2014   • Herpes zoster    • Hyperlipemia    • Hypogammaglobulinemia (CMS/HCC)    • Migraine headache    • Osteopenia    • Polyp of sigmoid colon    • PONV (postoperative nausea and vomiting)    • Routine adult health maintenance 6/7/2017       Past Surgical History:   Procedure Laterality Date   • COLONOSCOPY N/A 10/17/2016    Procedure: COLONOSCOPY TO CECUM WITH COLD BIOPSY POLYPECTOMY;  Surgeon: Anny Salamanca MD;  Location: [facility] ENDOSCOPY;  Service:    • MASTECTOMY         has No Known Allergies.       Medication List      ASK your doctor about these medications    ascorbic acid 100 MG tablet  Commonly known as:  VITAMIN C     Calcium-Vitamin D 600-200 MG-UNIT capsule     clobetasol 0.05 % ointment  Commonly known as:  TEMOVATE     MUCUS RELIEF ADULT PO     multivitamin tablet     ZOLMitriptan 5 MG tablet  Commonly known as:  ZOMIG  Take one tablet by mouth at onset of migraine and may repeat once after 2   hours if needed (max dose 10mg)          Family History   Problem Relation Age of Onset   • Lung cancer Father    • Colon cancer Brother    • Dementia Other        Social History     Socioeconomic History   • Marital status:      Spouse name: Not on file   • Number of children: Not on file   • Years of education: Not on file   • Highest education level: Not on file   Occupational History   • Occupation:    Tobacco Use   • Smoking status: Never Smoker   Substance and Sexual  Activity   • Alcohol use: No   • Drug use: No   Social History Narrative     -  retired in January    3 children - one grandchild    Nonsmoker    No regular churchgoing    Large supportive family       Vitals:    10/07/19 0625   BP: 151/67   Pulse: 70   Resp: 16   Temp: 97.6 °F (36.4 °C)   SpO2: 100%       Body mass index is 18.62 kg/m².    Physical Exam    General: No acute distress  Lungs: No labored breathing, Pulse oximetry on room air is 100%.  Heart: RRR  Abdo: Soft  Mental:  Awake, alert, and oriented    Imp:     · Screening  · brother with colon cancer death at age 50  · History of hyperplastic polyps in 2002 and 2013, but a tubular adenoma in 2016  · history of breast cancer and CLL     Plan:  · C scope    Anny Salamanca MD  7:14 AM

## 2019-10-25 ENCOUNTER — TELEPHONE (OUTPATIENT)
Dept: INTERNAL MEDICINE | Facility: CLINIC | Age: 69
End: 2019-10-25

## 2019-10-28 ENCOUNTER — TELEPHONE (OUTPATIENT)
Dept: INTERNAL MEDICINE | Facility: CLINIC | Age: 69
End: 2019-10-28

## 2019-10-28 NOTE — TELEPHONE ENCOUNTER
----- Message from Britany Duong MA sent at 10/23/2019  1:33 PM EDT -----  Contact: [employer] @ [phone] #addi      ----- Message -----  From: Meg Garcai  Sent: 10/23/2019  10:21 AM  To: [facility] Clinical Pool    CHASTITY    June 5 2019.     Need an additional diagnosis code for Triglycerides and the TSH

## 2019-11-11 ENCOUNTER — OFFICE VISIT (OUTPATIENT)
Dept: INTERNAL MEDICINE | Facility: CLINIC | Age: 69
End: 2019-11-11

## 2019-11-11 VITALS
DIASTOLIC BLOOD PRESSURE: 64 MMHG | HEIGHT: 64 IN | WEIGHT: 111.6 LBS | OXYGEN SATURATION: 98 % | SYSTOLIC BLOOD PRESSURE: 126 MMHG | BODY MASS INDEX: 19.05 KG/M2 | HEART RATE: 78 BPM

## 2019-11-11 DIAGNOSIS — G43.009 ATYPICAL MIGRAINE: Primary | ICD-10-CM

## 2019-11-11 DIAGNOSIS — C91.10 CHRONIC LYMPHOCYTIC LEUKEMIA (HCC): ICD-10-CM

## 2019-11-11 DIAGNOSIS — M81.0 AGE-RELATED OSTEOPOROSIS WITHOUT CURRENT PATHOLOGICAL FRACTURE: ICD-10-CM

## 2019-11-11 PROBLEM — K63.5 COLON POLYPS: Status: RESOLVED | Noted: 2019-06-20 | Resolved: 2019-11-11

## 2019-11-11 PROCEDURE — 99214 OFFICE O/P EST MOD 30 MIN: CPT | Performed by: NURSE PRACTITIONER

## 2019-11-11 RX ORDER — INFLUENZA VACCINE, ADJUVANTED 15; 15; 15 UG/.5ML; UG/.5ML; UG/.5ML
INJECTION, SUSPENSION INTRAMUSCULAR
COMMUNITY
Start: 2019-09-09 | End: 2019-11-11

## 2019-11-11 NOTE — PROGRESS NOTES
"Subjective    Palak Berumen is a 69 y.o. female presenting today for   Chief Complaint   Patient presents with   • Establish Care     Formerly Dr. Marino Pt   • Headache       History of Present Illness     Palak Berumen presents today as a new patient to me to establish care.   Prior PCP was Kait Marino, and her current/chronic health conditions include:    Patient Active Problem List   Diagnosis   • Chronic lymphocytic leukemia (CMS/HCC)   • Atypical migraine   • Osteoporosis     Migraines - intermittent, sometimes 1-3x/wk, sometimes may go several weeks w/o, had good success w/ Accupuncture, Zomig is successful w/o SEs    CLL - seeing Ravindra [facility] Hem/Onc yearly    Osteoporosis - DXA scheduled for Wed through GYN office    New complaints today include: none    The following portions of the patient's history were reviewed and updated as appropriate: allergies, current medications, past family history, past medical history, past social history, past surgical history and problem list.    Review of Systems   Skin: Positive for dry skin.   Neurological: Positive for headache.   All other systems reviewed and are negative.      Objective    Vitals:    11/11/19 0806   BP: 126/64   Pulse: 78   SpO2: 98%   Weight: 50.6 kg (111 lb 9.6 oz)   Height: 162.6 cm (64\")     Body mass index is 19.16 kg/m².  Nursing notes and vitals reviewed.    Physical Exam   Constitutional: She is oriented to person, place, and time. She appears well-developed and well-nourished. No distress.   HENT:   Right Ear: Hearing, tympanic membrane, external ear and ear canal normal.   Left Ear: Hearing, tympanic membrane, external ear and ear canal normal.   Nose: Nose normal.   Mouth/Throat: Uvula is midline, oropharynx is clear and moist and mucous membranes are normal.   Neck: Neck supple. No thyroid mass and no thyromegaly present.   Cardiovascular: Regular rhythm, S1 normal, S2 normal and normal pulses. Exam reveals no gallop and no " friction rub.   No murmur heard.  Pulmonary/Chest: Effort normal and breath sounds normal. She has no wheezes. She has no rhonchi. She has no rales.   Lymphadenopathy:     She has no cervical adenopathy.   Neurological: She is alert and oriented to person, place, and time. No cranial nerve deficit or sensory deficit.   Psychiatric: She has a normal mood and affect. Her speech is normal and behavior is normal. She is attentive.       Assessment and Plan    Palak was seen today for establish care and headache.    Diagnoses and all orders for this visit:    Atypical migraine    Chronic lymphocytic leukemia (CMS/HCC)    Age-related osteoporosis without current pathological fracture    She will continue Zomig PRN.  She will continue to follow w/o Onc and GYN as scheduled.    Return in about 7 months (around 6/20/2020) for Medicare Wellness.

## 2019-12-04 ENCOUNTER — OFFICE VISIT (OUTPATIENT)
Dept: INTERNAL MEDICINE | Facility: CLINIC | Age: 69
End: 2019-12-04

## 2019-12-04 VITALS
BODY MASS INDEX: 19.09 KG/M2 | HEART RATE: 65 BPM | HEIGHT: 64 IN | SYSTOLIC BLOOD PRESSURE: 130 MMHG | WEIGHT: 111.8 LBS | TEMPERATURE: 98.2 F | RESPIRATION RATE: 18 BRPM | DIASTOLIC BLOOD PRESSURE: 72 MMHG | OXYGEN SATURATION: 97 %

## 2019-12-04 DIAGNOSIS — B97.89 VIRAL RESPIRATORY ILLNESS: Primary | ICD-10-CM

## 2019-12-04 DIAGNOSIS — J98.8 VIRAL RESPIRATORY ILLNESS: Primary | ICD-10-CM

## 2019-12-04 PROCEDURE — 99213 OFFICE O/P EST LOW 20 MIN: CPT | Performed by: NURSE PRACTITIONER

## 2019-12-04 RX ORDER — AZITHROMYCIN 250 MG/1
TABLET, FILM COATED ORAL
Qty: 6 TABLET | Refills: 0 | Status: SHIPPED | OUTPATIENT
Start: 2019-12-04 | End: 2019-12-18

## 2019-12-04 NOTE — PROGRESS NOTES
"Subjective   Palak Berumen is a 69 y.o. female presenting today for   Chief Complaint   Patient presents with   • Dizziness       Dizziness   This is a new problem. The current episode started today. The problem occurs intermittently (one episode). The problem has been gradually improving. Associated symptoms include congestion and nausea. Pertinent negatives include no chest pain, coughing, fever or vomiting. Exacerbated by: sudden movement of her head. Treatments tried: mucinex.        The following portions of the patient's history were reviewed and updated as appropriate: allergies, current medications, past family history, past medical history, past social history, past surgical history and problem list.    Review of Systems   Constitutional: Negative for fever.   HENT: Positive for congestion and sinus pressure. Negative for ear pain (pressure on r side).    Respiratory: Negative for cough and shortness of breath.    Cardiovascular: Negative for chest pain and palpitations.   Gastrointestinal: Positive for nausea. Negative for diarrhea and vomiting.   Neurological: Positive for dizziness.       Objective   Vitals:    12/04/19 1433   BP: 130/72   BP Location: Right arm   Patient Position: Sitting   Cuff Size: Adult   Pulse: 65   Resp: 18   Temp: 98.2 °F (36.8 °C)   TempSrc: Oral   SpO2: 97%   Weight: 50.7 kg (111 lb 12.8 oz)   Height: 162.6 cm (64\")     Body mass index is 19.19 kg/m².  Nursing notes and vitals reviewed.    Physical Exam   Constitutional: She is oriented to person, place, and time. She appears well-developed and well-nourished. No distress.   HENT:   Right Ear: External ear and ear canal normal. Tympanic membrane is not erythematous and not bulging. A middle ear effusion is present.   Left Ear: External ear and ear canal normal. Tympanic membrane is not erythematous and not bulging. A middle ear effusion is present.   Nose: Mucosal edema and rhinorrhea present.   Mouth/Throat: Uvula is " "midline, oropharynx is clear and moist and mucous membranes are normal. No tonsillar exudate.   Eyes: Conjunctivae and EOM are normal. Pupils are equal, round, and reactive to light. Right eye exhibits no discharge. Left eye exhibits no discharge.   Neck: Neck supple.   Cardiovascular: Regular rhythm, S1 normal, S2 normal and normal heart sounds. Exam reveals no gallop and no friction rub.   No murmur heard.  Pulmonary/Chest: Effort normal and breath sounds normal.   Lymphadenopathy:     She has no cervical adenopathy.   Neurological: She is alert and oriented to person, place, and time. She has normal strength and normal reflexes. No cranial nerve deficit or sensory deficit. She displays a negative Romberg sign.   Psychiatric: She has a normal mood and affect. Her behavior is normal. Thought content normal. She is attentive.         Assessment/Plan   Palak was seen today for dizziness.    Diagnoses and all orders for this visit:    Viral respiratory illness    Other orders  -     azithromycin (ZITHROMAX Z-PARTH) 250 MG tablet; Take 2 tablets the first day, then 1 tablet daily for 4 days.    I explained to Ms. Berumen the self-limiting nature of a viral illness. We discussed symptomatic measures including Sudafed and Flonase. Ms. Berumen sts., \"I feel I have a sins infection.\" and \"I always need to take a Zpak to get over these.\" I explained that she does not meet the diagnostic criteria for ABRS and that abx is not appropriate for tx of a viral illness. Pt continues to insist on a Zpax Rx. I counseled her re the black box warning associated w/ Zpak r/t cardiac disruptions as well as the potential for digestive SEs and c.diff. Ms. Berumen verbalized understanding but continued to insist on a Zpak Rx. This was provided to her but it is against my advice.      Return if symptoms worsen or fail to improve.       "

## 2019-12-18 ENCOUNTER — OFFICE VISIT (OUTPATIENT)
Dept: FAMILY MEDICINE CLINIC | Facility: CLINIC | Age: 69
End: 2019-12-18

## 2019-12-18 VITALS
SYSTOLIC BLOOD PRESSURE: 128 MMHG | HEIGHT: 64 IN | BODY MASS INDEX: 18.98 KG/M2 | HEART RATE: 70 BPM | WEIGHT: 111.2 LBS | DIASTOLIC BLOOD PRESSURE: 62 MMHG | TEMPERATURE: 97.7 F | OXYGEN SATURATION: 99 %

## 2019-12-18 DIAGNOSIS — C91.10 CHRONIC LYMPHOCYTIC LEUKEMIA (HCC): ICD-10-CM

## 2019-12-18 DIAGNOSIS — J30.2 SEASONAL ALLERGIC RHINITIS, UNSPECIFIED TRIGGER: Primary | ICD-10-CM

## 2019-12-18 DIAGNOSIS — G43.009 ATYPICAL MIGRAINE: ICD-10-CM

## 2019-12-18 PROCEDURE — 99213 OFFICE O/P EST LOW 20 MIN: CPT | Performed by: FAMILY MEDICINE

## 2019-12-18 RX ORDER — FLUTICASONE PROPIONATE 50 MCG
2 SPRAY, SUSPENSION (ML) NASAL DAILY
Qty: 1 BOTTLE | Refills: 3 | Status: SHIPPED | OUTPATIENT
Start: 2019-12-18 | End: 2021-06-30 | Stop reason: SDUPTHER

## 2019-12-18 NOTE — PROGRESS NOTES
Chief Complaint   Patient presents with   • Migraine     refill on zomig; NP       Subjective   Palak Berumen is a 69 y.o. female.     History of Present Illness     68 yo female new patient here to establish care with a new provider.    PMH breast cancer s/p mastectomy, CLL pt stage 0 monitored sees Dr Zamora, reviewed labs     Migraines: prn Zomig, has been stable  Allergic rhinitis: Does saline rinses, has been persistent issue for patient    HM: Physical in June with Dr Marino, c-scope Cheikh in fall gets [id number], Dr Salty JACKSON on pap smears      Past Medical History:   Diagnosis Date   • Abnormal cervical Papanicolaou smear    • Allergic rhinitis    • Breast cancer (CMS/HCC)    • CLL (chronic lymphocytic leukemia) (CMS/[facility])     patient state stage zero, not currently treated (10/17/2017)   • Colon polyps 6/20/2019   • Herpes zoster    • Hypogammaglobulinemia (CMS/HCC)    • Migraine headache    • Osteopenia    • Polyp of sigmoid colon        Past Surgical History:   Procedure Laterality Date   • BREAST RECONSTRUCTION Left 1990   • COLONOSCOPY N/A 10/17/2016    Procedure: COLONOSCOPY TO CECUM WITH COLD BIOPSY POLYPECTOMY;  Surgeon: Anny Salamanca MD;  Location: [facility] ENDOSCOPY;  Service:    • COLONOSCOPY N/A 10/7/2019    Procedure: COLONOSCOPY INTO CECUM AND TI;  Surgeon: Anny Salamanca MD;  Location: [facility] ENDOSCOPY;  Service: General   • MASTECTOMY Left 1990   • MASTECTOMY Bilateral 1991       Family History   Problem Relation Age of Onset   • Lung cancer Father    • Dementia Father    • Colon cancer Brother        Social History     Socioeconomic History   • Marital status:      Spouse name: Not on file   • Number of children: Not on file   • Years of education: Not on file   • Highest education level: Not on file   Occupational History   • Occupation:    Tobacco Use   • Smoking status: Never Smoker   Substance and Sexual Activity   • Alcohol use: Yes     Comment: rare   • Drug use:  No   • Sexual activity: Yes     Partners: Male     Comment:    Social History Narrative     -  retired in January    3 children - one grandchild    Nonsmoker    No regular churchgoing    Large supportive family       Current Outpatient Medications on File Prior to Visit   Medication Sig Dispense Refill   • Ascorbic Acid (VITAMIN C) 100 MG tablet Take 100 mg by mouth daily.     • Calcium Carbonate-Vitamin D (CALCIUM-VITAMIN D) 600-200 MG-UNIT capsule Take by mouth.     • Cyanocobalamin (VITAMIN B-12 PO) Take  by mouth.     • Multiple Vitamin (MULTIVITAMIN) tablet Take 1 tablet by mouth daily.     • VITAMIN D PO Take  by mouth.     • ZOLMitriptan (ZOMIG) 5 MG tablet Take one tablet by mouth at onset of migraine and may repeat once after 2 hours if needed (max dose 10mg) 12 tablet 4   • [DISCONTINUED] azithromycin (ZITHROMAX Z-PARTH) 250 MG tablet Take 2 tablets the first day, then 1 tablet daily for 4 days. 6 tablet 0   • [DISCONTINUED] clobetasol (TEMOVATE) 0.05 % ointment        No current facility-administered medications on file prior to visit.        The following portions of the patient's history were reviewed and updated as appropriate: allergies, current medications, past family history, past medical history, past social history, past surgical history and problem list.    Review of Systems   Constitutional: Negative for chills and fever.   HENT: Negative for congestion.    Eyes: Negative for blurred vision.   Respiratory: Negative for cough and shortness of breath.    Cardiovascular: Negative for chest pain.   Gastrointestinal: Negative for constipation.   Genitourinary: Negative for decreased urine volume and difficulty urinating.   Musculoskeletal: Negative for gait problem.   Neurological: Negative for dizziness.   Psychiatric/Behavioral: Negative for depressed mood.           Vitals:    12/18/19 0909   BP: 128/62   Pulse: 70   Temp: 97.7 °F (36.5 °C)   SpO2: 99%      Objective   Physical  Exam   Constitutional: She is oriented to person, place, and time. She appears well-developed and well-nourished.   HENT:   Head: Normocephalic and atraumatic.   Eyes: Pupils are equal, round, and reactive to light. Conjunctivae and EOM are normal.   Neck: Neck supple.   Cardiovascular: Normal rate, regular rhythm and normal heart sounds.   No murmur heard.  Pulmonary/Chest: Effort normal and breath sounds normal. No respiratory distress.   Abdominal: Soft. Bowel sounds are normal.   Musculoskeletal: Normal range of motion.   Neurological: She is alert and oriented to person, place, and time.   Skin: Skin is warm and dry.   Psychiatric: She has a normal mood and affect.   Nursing note and vitals reviewed.        Assessment/Plan   Problem List Items Addressed This Visit     None      Visit Diagnoses     Seasonal allergic rhinitis, unspecified trigger    -  Primary    Relevant Medications    fluticasone (FLONASE) 50 MCG/ACT nasal spray        -Start flonase, can continue sinus rinse  -Can refill Zomig uses it prn for Migraines several times a month    Due for next annual in June. Follows with Dr Zamora CLL which has been stable  Sees Dr Pond GYN, UTD on pap smears, patient to call and get DEXA set up            No follow-ups on file.      Marielena Domínguez MD

## 2020-03-17 ENCOUNTER — TELEPHONE (OUTPATIENT)
Dept: FAMILY MEDICINE CLINIC | Facility: CLINIC | Age: 70
End: 2020-03-17

## 2020-03-17 ENCOUNTER — OFFICE VISIT (OUTPATIENT)
Dept: FAMILY MEDICINE CLINIC | Facility: CLINIC | Age: 70
End: 2020-03-17

## 2020-03-17 VITALS
DIASTOLIC BLOOD PRESSURE: 60 MMHG | BODY MASS INDEX: 19.12 KG/M2 | RESPIRATION RATE: 16 BRPM | SYSTOLIC BLOOD PRESSURE: 140 MMHG | TEMPERATURE: 98.1 F | WEIGHT: 112 LBS | HEIGHT: 64 IN | HEART RATE: 92 BPM | OXYGEN SATURATION: 98 %

## 2020-03-17 DIAGNOSIS — J32.0 MAXILLARY SINUSITIS, UNSPECIFIED CHRONICITY: Primary | ICD-10-CM

## 2020-03-17 PROCEDURE — 99213 OFFICE O/P EST LOW 20 MIN: CPT | Performed by: NURSE PRACTITIONER

## 2020-03-17 RX ORDER — GUAIFENESIN 600 MG/1
1200 TABLET, EXTENDED RELEASE ORAL 2 TIMES DAILY
COMMUNITY
Start: 2020-03-17 | End: 2021-06-30

## 2020-03-17 NOTE — PATIENT INSTRUCTIONS
Sinusitis, Adult  Sinusitis is soreness and swelling (inflammation) of your sinuses. Sinuses are hollow spaces in the bones around your face. They are located:  · Around your eyes.  · In the middle of your forehead.  · Behind your nose.  · In your cheekbones.  Your sinuses and nasal passages are lined with a fluid called mucus. Mucus drains out of your sinuses. Swelling can trap mucus in your sinuses. This lets germs (bacteria, virus, or fungus) grow, which leads to infection. Most of the time, this condition is caused by a virus.  What are the causes?  This condition is caused by:  · Allergies.  · Asthma.  · Germs.  · Things that block your nose or sinuses.  · Growths in the nose (nasal polyps).  · Chemicals or irritants in the air.  · Fungus (rare).  What increases the risk?  You are more likely to develop this condition if:  · You have a weak body defense system (immune system).  · You do a lot of swimming or diving.  · You use nasal sprays too much.  · You smoke.  What are the signs or symptoms?  The main symptoms of this condition are pain and a feeling of pressure around the sinuses. Other symptoms include:  · Stuffy nose (congestion).  · Runny nose (drainage).  · Swelling and warmth in the sinuses.  · Headache.  · Toothache.  · A cough that may get worse at night.  · Mucus that collects in the throat or the back of the nose (postnasal drip).  · Being unable to smell and taste.  · Being very tired (fatigue).  · A fever.  · Sore throat.  · Bad breath.  How is this diagnosed?  This condition is diagnosed based on:  · Your symptoms.  · Your medical history.  · A physical exam.  · Tests to find out if your condition is short-term (acute) or long-term (chronic). Your doctor may:  ? Check your nose for growths (polyps).  ? Check your sinuses using a tool that has a light (endoscope).  ? Check for allergies or germs.  ? Do imaging tests, such as an MRI or CT scan.  How is this treated?  Treatment for this condition  depends on the cause and whether it is short-term or long-term.  · If caused by a virus, your symptoms should go away on their own within 10 days. You may be given medicines to relieve symptoms. They include:  ? Medicines that shrink swollen tissue in the nose.  ? Medicines that treat allergies (antihistamines).  ? A spray that treats swelling of the nostrils.   ? Rinses that help get rid of thick mucus in your nose (nasal saline washes).  · If caused by bacteria, your doctor may wait to see if you will get better without treatment. You may be given antibiotic medicine if you have:  ? A very bad infection.  ? A weak body defense system.  · If caused by growths in the nose, you may need to have surgery.  Follow these instructions at home:  Medicines  · Take, use, or apply over-the-counter and prescription medicines only as told by your doctor. These may include nasal sprays.  · If you were prescribed an antibiotic medicine, take it as told by your doctor. Do not stop taking the antibiotic even if you start to feel better.  Hydrate and humidify    · Drink enough water to keep your pee (urine) pale yellow.  · Use a cool mist humidifier to keep the humidity level in your home above 50%.  · Breathe in steam for 10-15 minutes, 3-4 times a day, or as told by your doctor. You can do this in the bathroom while a hot shower is running.  · Try not to spend time in cool or dry air.  Rest  · Rest as much as you can.  · Sleep with your head raised (elevated).  · Make sure you get enough sleep each night.  General instructions    · Put a warm, moist washcloth on your face 3-4 times a day, or as often as told by your doctor. This will help with discomfort.  · Wash your hands often with soap and water. If there is no soap and water, use hand .  · Do not smoke. Avoid being around people who are smoking (secondhand smoke).  · Keep all follow-up visits as told by your doctor. This is important.  Contact a doctor if:  · You  have a fever.  · Your symptoms get worse.  · Your symptoms do not get better within 10 days.  Get help right away if:  · You have a very bad headache.  · You cannot stop throwing up (vomiting).  · You have very bad pain or swelling around your face or eyes.  · You have trouble seeing.  · You feel confused.  · Your neck is stiff.  · You have trouble breathing.  Summary  · Sinusitis is swelling of your sinuses. Sinuses are hollow spaces in the bones around your face.  · This condition is caused by tissues in your nose that become inflamed or swollen. This traps germs. These can lead to infection.  · If you were prescribed an antibiotic medicine, take it as told by your doctor. Do not stop taking it even if you start to feel better.  · Keep all follow-up visits as told by your doctor. This is important.  This information is not intended to replace advice given to you by your health care provider. Make sure you discuss any questions you have with your health care provider.  Document Released: 06/05/2009 Document Revised: 05/20/2019 Document Reviewed: 05/20/2019  [employer] Interactive Patient Education © 2020 [employer]

## 2020-03-17 NOTE — PROGRESS NOTES
"Chief Complaint   Patient presents with   • Cough   • Chills       Upper Respiratory Infection: Patient complains of symptoms of a URI. Symptoms include congestion, cough and scratchy throat and chills. Onset of symptoms was 3 days ago, gradually improving since that time. She also c/o no  fever and non productive cough for the past 3 days .  She is drinking moderate amounts of fluids. Evaluation to date: none. Treatment to date: antihistamines and nasal steroids.  Ill contacts at home or school or work discussed.    The following portions of the patient's history were reviewed and updated as appropriate: allergies, current medications, past family history, past medical history, past social history, past surgical history and problem list.    A comprehensive review of systems was negative except for: Ears, nose, mouth, throat, and face: positive for nasal congestion  Respiratory: positive for cough    Vitals:    03/17/20 0959   BP: 140/60   BP Location: Right arm   Patient Position: Sitting   Cuff Size: Adult   Pulse: 92   Resp: 16   Temp: 98.1 °F (36.7 °C)   SpO2: 98%   Weight: 50.8 kg (112 lb)   Height: 162.6 cm (64\")     Gen: Well appearing, alert  Head:  Maxillary sinus tenderness  Ears: TM's normal without redness  Nose:  Congestion  Throat:  Pink without exudate, PND noted  Neck: No LAD  Lung: Good air movement, regular RR  Heart: RR without murmur  Skin: No rash      Assessment/Plan   Palak was seen today for cough and chills.    Diagnoses and all orders for this visit:    Maxillary sinusitis, unspecified chronicity   Continue Flonase and Sudafed as directed    Other orders  -     guaiFENesin (Mucinex) 600 MG 12 hr tablet; Take 2 tablets by mouth 2 (Two) Times a Day.    Takes as needed for chest congestion           Patient Instructions   Sinusitis, Adult  Sinusitis is soreness and swelling (inflammation) of your sinuses. Sinuses are hollow spaces in the bones around your face. They are located:  · Around " your eyes.  · In the middle of your forehead.  · Behind your nose.  · In your cheekbones.  Your sinuses and nasal passages are lined with a fluid called mucus. Mucus drains out of your sinuses. Swelling can trap mucus in your sinuses. This lets germs (bacteria, virus, or fungus) grow, which leads to infection. Most of the time, this condition is caused by a virus.  What are the causes?  This condition is caused by:  · Allergies.  · Asthma.  · Germs.  · Things that block your nose or sinuses.  · Growths in the nose (nasal polyps).  · Chemicals or irritants in the air.  · Fungus (rare).  What increases the risk?  You are more likely to develop this condition if:  · You have a weak body defense system (immune system).  · You do a lot of swimming or diving.  · You use nasal sprays too much.  · You smoke.  What are the signs or symptoms?  The main symptoms of this condition are pain and a feeling of pressure around the sinuses. Other symptoms include:  · Stuffy nose (congestion).  · Runny nose (drainage).  · Swelling and warmth in the sinuses.  · Headache.  · Toothache.  · A cough that may get worse at night.  · Mucus that collects in the throat or the back of the nose (postnasal drip).  · Being unable to smell and taste.  · Being very tired (fatigue).  · A fever.  · Sore throat.  · Bad breath.  How is this diagnosed?  This condition is diagnosed based on:  · Your symptoms.  · Your medical history.  · A physical exam.  · Tests to find out if your condition is short-term (acute) or long-term (chronic). Your doctor may:  ? Check your nose for growths (polyps).  ? Check your sinuses using a tool that has a light (endoscope).  ? Check for allergies or germs.  ? Do imaging tests, such as an MRI or CT scan.  How is this treated?  Treatment for this condition depends on the cause and whether it is short-term or long-term.  · If caused by a virus, your symptoms should go away on their own within 10 days. You may be given  medicines to relieve symptoms. They include:  ? Medicines that shrink swollen tissue in the nose.  ? Medicines that treat allergies (antihistamines).  ? A spray that treats swelling of the nostrils.   ? Rinses that help get rid of thick mucus in your nose (nasal saline washes).  · If caused by bacteria, your doctor may wait to see if you will get better without treatment. You may be given antibiotic medicine if you have:  ? A very bad infection.  ? A weak body defense system.  · If caused by growths in the nose, you may need to have surgery.  Follow these instructions at home:  Medicines  · Take, use, or apply over-the-counter and prescription medicines only as told by your doctor. These may include nasal sprays.  · If you were prescribed an antibiotic medicine, take it as told by your doctor. Do not stop taking the antibiotic even if you start to feel better.  Hydrate and humidify    · Drink enough water to keep your pee (urine) pale yellow.  · Use a cool mist humidifier to keep the humidity level in your home above 50%.  · Breathe in steam for 10-15 minutes, 3-4 times a day, or as told by your doctor. You can do this in the bathroom while a hot shower is running.  · Try not to spend time in cool or dry air.  Rest  · Rest as much as you can.  · Sleep with your head raised (elevated).  · Make sure you get enough sleep each night.  General instructions    · Put a warm, moist washcloth on your face 3-4 times a day, or as often as told by your doctor. This will help with discomfort.  · Wash your hands often with soap and water. If there is no soap and water, use hand .  · Do not smoke. Avoid being around people who are smoking (secondhand smoke).  · Keep all follow-up visits as told by your doctor. This is important.  Contact a doctor if:  · You have a fever.  · Your symptoms get worse.  · Your symptoms do not get better within 10 days.  Get help right away if:  · You have a very bad headache.  · You cannot  stop throwing up (vomiting).  · You have very bad pain or swelling around your face or eyes.  · You have trouble seeing.  · You feel confused.  · Your neck is stiff.  · You have trouble breathing.  Summary  · Sinusitis is swelling of your sinuses. Sinuses are hollow spaces in the bones around your face.  · This condition is caused by tissues in your nose that become inflamed or swollen. This traps germs. These can lead to infection.  · If you were prescribed an antibiotic medicine, take it as told by your doctor. Do not stop taking it even if you start to feel better.  · Keep all follow-up visits as told by your doctor. This is important.  This information is not intended to replace advice given to you by your health care provider. Make sure you discuss any questions you have with your health care provider.  Document Released: 06/05/2009 Document Revised: 05/20/2019 Document Reviewed: 05/20/2019  [employer] Interactive Patient Education © 2020 [employer]        Tylenol or Advil as needed for pain, fever, muscle aches  Plenty of fluids  Hand washing discussed  Off work or school note given if needed.  Warm tea for throat.  Pros and cons of antibiotic use discussed.  None provided today.    Instructed to notify us if symptoms worsen or do not improve.      CHAYO Rico  Family Practice  [facility]

## 2020-06-16 ENCOUNTER — TELEPHONE (OUTPATIENT)
Dept: FAMILY MEDICINE CLINIC | Facility: CLINIC | Age: 70
End: 2020-06-16

## 2020-06-16 DIAGNOSIS — E78.2 MIXED HYPERLIPIDEMIA: ICD-10-CM

## 2020-06-16 DIAGNOSIS — Z00.00 ANNUAL PHYSICAL EXAM: ICD-10-CM

## 2020-06-16 DIAGNOSIS — R73.9 HYPERGLYCEMIA: ICD-10-CM

## 2020-06-16 DIAGNOSIS — R73.9 HYPERGLYCEMIA: Primary | ICD-10-CM

## 2020-06-17 LAB
ALBUMIN SERPL-MCNC: 4.7 G/DL (ref 3.5–5.2)
ALBUMIN/GLOB SERPL: 2.9 G/DL
ALP SERPL-CCNC: 62 U/L (ref 39–117)
ALT SERPL-CCNC: 24 U/L (ref 1–33)
AST SERPL-CCNC: 29 U/L (ref 1–32)
BASOPHILS # BLD AUTO: ABNORMAL 10*3/UL
BILIRUB SERPL-MCNC: 0.3 MG/DL (ref 0.2–1.2)
BUN SERPL-MCNC: 10 MG/DL (ref 8–23)
BUN/CREAT SERPL: 14.1 (ref 7–25)
CALCIUM SERPL-MCNC: 9.2 MG/DL (ref 8.6–10.5)
CHLORIDE SERPL-SCNC: 104 MMOL/L (ref 98–107)
CHOLEST SERPL-MCNC: 190 MG/DL (ref 0–200)
CO2 SERPL-SCNC: 27.9 MMOL/L (ref 22–29)
CREAT SERPL-MCNC: 0.71 MG/DL (ref 0.57–1)
DIFFERENTIAL COMMENT: ABNORMAL
EOSINOPHIL # BLD AUTO: ABNORMAL 10*3/UL
EOSINOPHIL # BLD MANUAL: 0.11 10*3/MM3 (ref 0–0.4)
EOSINOPHIL NFR BLD AUTO: ABNORMAL %
EOSINOPHIL NFR BLD MANUAL: 1 % (ref 0.3–6.2)
ERYTHROCYTE [DISTWIDTH] IN BLOOD BY AUTOMATED COUNT: 12.5 % (ref 12.3–15.4)
GLOBULIN SER CALC-MCNC: 1.6 GM/DL
GLUCOSE SERPL-MCNC: 95 MG/DL (ref 65–99)
HBA1C MFR BLD: 5.8 % (ref 4.8–5.6)
HCT VFR BLD AUTO: 38.9 % (ref 34–46.6)
HDLC SERPL-MCNC: 94 MG/DL (ref 40–60)
HGB BLD-MCNC: 12.7 G/DL (ref 12–15.9)
LDLC SERPL CALC-MCNC: 80 MG/DL (ref 0–100)
LYMPHOCYTES # BLD AUTO: ABNORMAL 10*3/UL
LYMPHOCYTES # BLD MANUAL: 5.25 10*3/MM3 (ref 0.7–3.1)
LYMPHOCYTES NFR BLD AUTO: ABNORMAL %
LYMPHOCYTES NFR BLD MANUAL: 45.9 % (ref 19.6–45.3)
MCH RBC QN AUTO: 32.4 PG (ref 26.6–33)
MCHC RBC AUTO-ENTMCNC: 32.6 G/DL (ref 31.5–35.7)
MCV RBC AUTO: 99.2 FL (ref 79–97)
MONOCYTES # BLD MANUAL: 0.35 10*3/MM3 (ref 0.1–0.9)
MONOCYTES NFR BLD AUTO: ABNORMAL %
MONOCYTES NFR BLD MANUAL: 3.1 % (ref 5–12)
NEUTROPHILS # BLD MANUAL: 5.72 10*3/MM3 (ref 1.7–7)
NEUTROPHILS NFR BLD AUTO: ABNORMAL %
NEUTROPHILS NFR BLD MANUAL: 50 % (ref 42.7–76)
PLATELET # BLD AUTO: 273 10*3/MM3 (ref 140–450)
PLATELET BLD QL SMEAR: ABNORMAL
POTASSIUM SERPL-SCNC: 4.3 MMOL/L (ref 3.5–5.2)
PROT SERPL-MCNC: 6.3 G/DL (ref 6–8.5)
RBC # BLD AUTO: 3.92 10*6/MM3 (ref 3.77–5.28)
RBC MORPH BLD: ABNORMAL
SODIUM SERPL-SCNC: 140 MMOL/L (ref 136–145)
TRIGL SERPL-MCNC: 82 MG/DL (ref 0–150)
TSH SERPL DL<=0.005 MIU/L-ACNC: 1.45 UIU/ML (ref 0.27–4.2)
VLDLC SERPL CALC-MCNC: 16.4 MG/DL
WBC # BLD AUTO: 11.44 10*3/MM3 (ref 3.4–10.8)

## 2020-06-24 ENCOUNTER — OFFICE VISIT (OUTPATIENT)
Dept: FAMILY MEDICINE CLINIC | Facility: CLINIC | Age: 70
End: 2020-06-24

## 2020-06-24 VITALS
SYSTOLIC BLOOD PRESSURE: 122 MMHG | WEIGHT: 110.2 LBS | TEMPERATURE: 97 F | DIASTOLIC BLOOD PRESSURE: 60 MMHG | BODY MASS INDEX: 18.82 KG/M2 | HEIGHT: 64 IN | OXYGEN SATURATION: 99 % | HEART RATE: 73 BPM

## 2020-06-24 DIAGNOSIS — Z00.00 ENCOUNTER FOR MEDICARE ANNUAL WELLNESS EXAM: Primary | ICD-10-CM

## 2020-06-24 DIAGNOSIS — C91.10 CHRONIC LYMPHOCYTIC LEUKEMIA (HCC): ICD-10-CM

## 2020-06-24 PROCEDURE — G0439 PPPS, SUBSEQ VISIT: HCPCS | Performed by: FAMILY MEDICINE

## 2020-06-24 NOTE — PROGRESS NOTES
The ABCs of the Annual Wellness Visit  Subsequent Medicare Wellness Visit    Chief Complaint   Patient presents with   • Annual Exam       Subjective   History of Present Illness:  Palak Berumen is a 70 y.o. female who presents for a Subsequent Medicare Wellness Visit.      HM: eats protein, veggies, fruits very active. Normotensive, weight appropriate.   -[facility] Orte[facility]-need records. Gets annual gyn exams   -Due for DEXA, Dr Ortega to set up   -S/p mastectomy 30 years ago   -S/p pelvic floor exercise this past month/improvement for bladder prolapse issues  -Sees Dr Brenton Zamora MD [facility], monitored, no intervention as been stable.   -c-scope in 2024 repeat no polyps last time     Doing well no complaints. A1c mildly elevated 5.8, discussed with patient.     HEALTH RISK ASSESSMENT    Recent Hospitalizations:  No hospitalization(s) within the last year.    Current Medical Providers:  Patient Care Team:  Marielena Domínguez MD as PCP - General (Family Medicine)  Brenton Zamora MD as PCP - Claims Attributed  Brenton Zamora MD as Consulting Physician (Medical Oncology)  Marycarmen Ortega MD as Consulting Physician (Obstetrics and Gynecology)    Smoking Status:  Social History     Tobacco Use   Smoking Status Never Smoker       Alcohol Consumption:  Social History     Substance and Sexual Activity   Alcohol Use Yes    Comment: rare       Depression Screen:   PHQ-2/PHQ-9 Depression Screening 6/24/2020   Little interest or pleasure in doing things 0   Feeling down, depressed, or hopeless 0   Trouble falling or staying asleep, or sleeping too much 0   Feeling tired or having little energy 0   Poor appetite or overeating 0   Feeling bad about yourself - or that you are a failure or have let yourself or your family down 0   Trouble concentrating on things, such as reading the newspaper or watching television 0   Moving or speaking so slowly that other people could have noticed. Or the opposite - being  so fidgety or restless that you have been moving around a lot more than usual 0   Thoughts that you would be better off dead, or of hurting yourself in some way 0   Total Score 0       Fall Risk Screen:  DIMAS Fall Risk Assessment was completed, and patient is at LOW risk for falls.Assessment completed on:6/24/2020    Health Habits and Functional and Cognitive Screening:  Functional & Cognitive Status 6/24/2020   Do you have difficulty preparing food and eating? No   Do you have difficulty bathing yourself, getting dressed or grooming yourself? No   Do you have difficulty using the toilet? No   Do you have difficulty moving around from place to place? No   Do you have trouble with steps or getting out of a bed or a chair? No   Current Diet Well Balanced Diet   Dental Exam Up to date   Eye Exam Not up to date   Exercise (times per week) 7 times per week   Current Exercise Activities Include Walking   Do you need help using the phone?  No   Are you deaf or do you have serious difficulty hearing?  No   Do you need help with transportation? No   Do you need help shopping? No   Do you need help preparing meals?  No   Do you need help with housework?  No   Do you need help with laundry? No   Do you need help taking your medications? No   Do you need help managing money? No   Do you ever drive or ride in a car without wearing a seat belt? No   Have you felt unusual stress, anger or loneliness in the last month? No   Who do you live with? Spouse   If you need help, do you have trouble finding someone available to you? No   Have you been bothered in the last four weeks by sexual problems? No   Do you have difficulty concentrating, remembering or making decisions? No         Does the patient have evidence of cognitive impairment? No    Asprin use counseling:Does not need ASA but is currently taking (advised patient that ASA is not indicated and patient chooses to stop it)    Age-appropriate Screening Schedule:  Refer to the  list below for future screening recommendations based on patient's age, sex and/or medical conditions. Orders for these recommended tests are listed in the plan section. The patient has been provided with a written plan.    Health Maintenance   Topic Date Due   • DXA SCAN  11/11/2020 (Originally 10/27/2016)   • ZOSTER VACCINE (2 of 3) 11/19/2020 (Originally 1/4/2016)   • INFLUENZA VACCINE  08/01/2020   • LIPID PANEL  06/17/2021   • COLONOSCOPY  10/07/2024   • TDAP/TD VACCINES  Discontinued          The following portions of the patient's history were reviewed and updated as appropriate: problem list.    Outpatient Medications Prior to Visit   Medication Sig Dispense Refill   • Ascorbic Acid (VITAMIN C) 100 MG tablet Take 100 mg by mouth daily.     • Calcium Carbonate-Vitamin D (CALCIUM-VITAMIN D) 600-200 MG-UNIT capsule Take by mouth.     • Cyanocobalamin (VITAMIN B-12 PO) Take  by mouth.     • fluticasone (FLONASE) 50 MCG/ACT nasal spray 2 sprays into the nostril(s) as directed by provider Daily. 1 bottle 3   • guaiFENesin (Mucinex) 600 MG 12 hr tablet Take 2 tablets by mouth 2 (Two) Times a Day.     • Multiple Vitamin (MULTIVITAMIN) tablet Take 1 tablet by mouth daily.     • ZOLMitriptan (ZOMIG) 5 MG tablet Take one tablet by mouth at onset of migraine and may repeat once after 2 hours if needed (max dose 10mg) 12 tablet 4   • VITAMIN D PO Take  by mouth.       No facility-administered medications prior to visit.        Patient Active Problem List   Diagnosis   • Chronic lymphocytic leukemia (CMS/HCC)   • Atypical migraine   • Osteoporosis       Advanced Care Planning:  ACP discussion was held with the patient during this visit. Patient does not have an advance directive, information provided.    Review of Systems   Constitutional: Negative for activity change, appetite change, chills and fatigue.   HENT: Negative for congestion.    Eyes: Negative.    Respiratory: Negative.    Gastrointestinal: Negative for blood  "in stool.   Genitourinary: Negative for difficulty urinating, dyspareunia and frequency.   Skin: Negative for rash.   Allergic/Immunologic: Positive for environmental allergies.   Neurological: Negative for dizziness.   Psychiatric/Behavioral: Negative for dysphoric mood. The patient is not nervous/anxious.        Compared to one year ago, the patient feels her physical health is the same.  Compared to one year ago, the patient feels her mental health is the same.    Reviewed chart for potential of high risk medication in the elderly: yes  Reviewed chart for potential of harmful drug interactions in the elderly:yes    Objective         Vitals:    06/24/20 0804   BP: 122/60   Pulse: 73   Temp: 97 °F (36.1 °C)   SpO2: 99%   Weight: 50 kg (110 lb 3.2 oz)   Height: 162.6 cm (64\")       Body mass index is 18.92 kg/m².  Discussed the patient's BMI with her. The BMI is in the acceptable range.    Physical Exam   Constitutional: She appears well-developed and well-nourished.   HENT:   Head: Normocephalic and atraumatic.   Eyes: Pupils are equal, round, and reactive to light. Conjunctivae and EOM are normal.   Neck: Normal range of motion.   Cardiovascular: Normal rate, regular rhythm and normal heart sounds. Exam reveals no gallop and no friction rub.   No murmur heard.  Pulmonary/Chest: Effort normal and breath sounds normal. No respiratory distress.   Abdominal: Soft. Bowel sounds are normal. She exhibits no distension. There is no tenderness. There is no guarding.   Musculoskeletal: Normal range of motion. She exhibits no edema or deformity.   Neurological: She is alert.   Skin: Capillary refill takes less than 2 seconds.   Psychiatric: She has a normal mood and affect.       Lab Results   Component Value Date    GLU 95 06/17/2020    CHLPL 190 06/17/2020    TRIG 82 06/17/2020    HDL 94 (H) 06/17/2020    LDL 80 06/17/2020    VLDL 16.4 06/17/2020    HGBA1C 5.80 (H) 06/17/2020        Assessment/Plan   Medicare Risks and " Personalized Health Plan  CMS Preventative Services Quick Reference  Advance Directive Discussion  Alcohol Misuse  Breast Cancer/Mammogram Screening  Cardiovascular risk  Chronic Pain   Colon Cancer Screening  Dementia/Memory   Depression/Dysphoria  Diabetic Lab Screening   Fall Risk  Glaucoma Risk  Hearing Problem  Inactivity/Sedentary  Osteoprorosis Risk    The above risks/problems have been discussed with the patient.  Pertinent information has been shared with the patient in the After Visit Summary.  Follow up plans and orders are seen below in the Assessment/Plan Section.    There are no diagnoses linked to this encounter.  Follow Up:  No follow-ups on file.     An After Visit Summary and PPPS were given to the patient.

## 2020-08-21 DIAGNOSIS — G43.009 MIGRAINE WITHOUT AURA AND WITHOUT STATUS MIGRAINOSUS, NOT INTRACTABLE: ICD-10-CM

## 2020-08-24 RX ORDER — ZOLMITRIPTAN 5 MG/1
TABLET, FILM COATED ORAL
Qty: 12 TABLET | Refills: 3 | Status: SHIPPED | OUTPATIENT
Start: 2020-08-24 | End: 2021-06-30 | Stop reason: SDUPTHER

## 2021-02-03 ENCOUNTER — IMMUNIZATION (OUTPATIENT)
Dept: VACCINE CLINIC | Facility: HOSPITAL | Age: 71
End: 2021-02-03

## 2021-02-03 PROCEDURE — 91300 HC SARSCOV02 VAC 30MCG/0.3ML IM: CPT | Performed by: INTERNAL MEDICINE

## 2021-02-03 PROCEDURE — 0001A: CPT | Performed by: INTERNAL MEDICINE

## 2021-02-24 ENCOUNTER — IMMUNIZATION (OUTPATIENT)
Dept: VACCINE CLINIC | Facility: HOSPITAL | Age: 71
End: 2021-02-24

## 2021-02-24 PROCEDURE — 91300 HC SARSCOV02 VAC 30MCG/0.3ML IM: CPT | Performed by: INTERNAL MEDICINE

## 2021-02-24 PROCEDURE — 0002A: CPT | Performed by: INTERNAL MEDICINE

## 2021-06-18 DIAGNOSIS — E78.2 MIXED HYPERLIPIDEMIA: ICD-10-CM

## 2021-06-18 DIAGNOSIS — D75.89 MACROCYTOSIS: ICD-10-CM

## 2021-06-18 DIAGNOSIS — C91.10 CHRONIC LYMPHOCYTIC LEUKEMIA (HCC): ICD-10-CM

## 2021-06-18 DIAGNOSIS — Z00.00 ANNUAL PHYSICAL EXAM: Primary | ICD-10-CM

## 2021-06-18 DIAGNOSIS — R73.03 PREDIABETES: ICD-10-CM

## 2021-06-24 LAB
ALBUMIN SERPL-MCNC: 4.9 G/DL (ref 3.5–5.2)
ALBUMIN/GLOB SERPL: 3.5 G/DL
ALP SERPL-CCNC: 68 U/L (ref 39–117)
ALT SERPL-CCNC: 20 U/L (ref 1–33)
AST SERPL-CCNC: 25 U/L (ref 1–32)
BASOPHILS # BLD AUTO: ABNORMAL 10*3/UL
BILIRUB SERPL-MCNC: 0.4 MG/DL (ref 0–1.2)
BUN SERPL-MCNC: 11 MG/DL (ref 8–23)
BUN/CREAT SERPL: 18 (ref 7–25)
CALCIUM SERPL-MCNC: 9.7 MG/DL (ref 8.6–10.5)
CHLORIDE SERPL-SCNC: 104 MMOL/L (ref 98–107)
CHOLEST SERPL-MCNC: 207 MG/DL (ref 0–200)
CO2 SERPL-SCNC: 29.2 MMOL/L (ref 22–29)
CREAT SERPL-MCNC: 0.61 MG/DL (ref 0.57–1)
DIFFERENTIAL COMMENT: ABNORMAL
EOSINOPHIL # BLD AUTO: ABNORMAL 10*3/UL
EOSINOPHIL # BLD MANUAL: 0.3 10*3/MM3 (ref 0–0.4)
EOSINOPHIL NFR BLD AUTO: ABNORMAL %
EOSINOPHIL NFR BLD MANUAL: 2.9 % (ref 0.3–6.2)
ERYTHROCYTE [DISTWIDTH] IN BLOOD BY AUTOMATED COUNT: 12.1 % (ref 12.3–15.4)
GLOBULIN SER CALC-MCNC: 1.4 GM/DL
GLUCOSE SERPL-MCNC: 93 MG/DL (ref 65–99)
HBA1C MFR BLD: 5.6 % (ref 4.8–5.6)
HCT VFR BLD AUTO: 39.2 % (ref 34–46.6)
HDLC SERPL-MCNC: 97 MG/DL (ref 40–60)
HGB BLD-MCNC: 13.1 G/DL (ref 12–15.9)
LDLC SERPL CALC-MCNC: 98 MG/DL (ref 0–100)
LYMPHOCYTES # BLD AUTO: ABNORMAL 10*3/UL
LYMPHOCYTES # BLD MANUAL: 5.38 10*3/MM3 (ref 0.7–3.1)
LYMPHOCYTES NFR BLD AUTO: ABNORMAL %
LYMPHOCYTES NFR BLD MANUAL: 51.9 % (ref 19.6–45.3)
MCH RBC QN AUTO: 32.6 PG (ref 26.6–33)
MCHC RBC AUTO-ENTMCNC: 33.4 G/DL (ref 31.5–35.7)
MCV RBC AUTO: 97.5 FL (ref 79–97)
MONOCYTES # BLD MANUAL: 0.6 10*3/MM3 (ref 0.1–0.9)
MONOCYTES NFR BLD AUTO: ABNORMAL %
MONOCYTES NFR BLD MANUAL: 5.8 % (ref 5–12)
NEUTROPHILS # BLD MANUAL: 4.09 10*3/MM3 (ref 1.7–7)
NEUTROPHILS NFR BLD AUTO: ABNORMAL %
NEUTROPHILS NFR BLD MANUAL: 39.4 % (ref 42.7–76)
PLATELET # BLD AUTO: 273 10*3/MM3 (ref 140–450)
PLATELET BLD QL SMEAR: ABNORMAL
POTASSIUM SERPL-SCNC: 4.1 MMOL/L (ref 3.5–5.2)
PROT SERPL-MCNC: 6.3 G/DL (ref 6–8.5)
RBC # BLD AUTO: 4.02 10*6/MM3 (ref 3.77–5.28)
RBC MORPH BLD: ABNORMAL
SODIUM SERPL-SCNC: 142 MMOL/L (ref 136–145)
TRIGL SERPL-MCNC: 66 MG/DL (ref 0–150)
VIT B12 SERPL-MCNC: >2000 PG/ML (ref 211–946)
VLDLC SERPL CALC-MCNC: 12 MG/DL (ref 5–40)
WBC # BLD AUTO: 10.37 10*3/MM3 (ref 3.4–10.8)

## 2021-06-30 ENCOUNTER — OFFICE VISIT (OUTPATIENT)
Dept: FAMILY MEDICINE CLINIC | Facility: CLINIC | Age: 71
End: 2021-06-30

## 2021-06-30 VITALS
WEIGHT: 113.4 LBS | HEART RATE: 78 BPM | TEMPERATURE: 97.8 F | OXYGEN SATURATION: 98 % | HEIGHT: 64 IN | BODY MASS INDEX: 19.36 KG/M2 | SYSTOLIC BLOOD PRESSURE: 122 MMHG | DIASTOLIC BLOOD PRESSURE: 78 MMHG

## 2021-06-30 DIAGNOSIS — G43.009 MIGRAINE WITHOUT AURA AND WITHOUT STATUS MIGRAINOSUS, NOT INTRACTABLE: ICD-10-CM

## 2021-06-30 DIAGNOSIS — J30.2 SEASONAL ALLERGIC RHINITIS, UNSPECIFIED TRIGGER: ICD-10-CM

## 2021-06-30 DIAGNOSIS — Z00.00 MEDICARE ANNUAL WELLNESS VISIT, SUBSEQUENT: Primary | ICD-10-CM

## 2021-06-30 PROCEDURE — G0439 PPPS, SUBSEQ VISIT: HCPCS | Performed by: FAMILY MEDICINE

## 2021-06-30 RX ORDER — FLUTICASONE PROPIONATE 50 MCG
2 SPRAY, SUSPENSION (ML) NASAL DAILY
Qty: 15.8 ML | Refills: 3 | Status: SHIPPED | OUTPATIENT
Start: 2021-06-30 | End: 2022-07-06 | Stop reason: SDUPTHER

## 2021-06-30 RX ORDER — ZOLMITRIPTAN 5 MG/1
TABLET, FILM COATED ORAL
Qty: 12 TABLET | Refills: 6 | Status: SHIPPED | OUTPATIENT
Start: 2021-06-30 | End: 2022-07-06 | Stop reason: SDUPTHER

## 2021-06-30 NOTE — PROGRESS NOTES
The ABCs of the Annual Wellness Visit  Subsequent Medicare Wellness Visit    Chief Complaint   Patient presents with   • Medicare Wellness-subsequent       Subjective   History of Present Illness:  Palak Berumen is a 71 y.o. female who presents for a Subsequent Medicare Wellness Visit.    HEALTH RISK ASSESSMENT    Recent Hospitalizations:  No hospitalization(s) within the last year.    Current Medical Providers:  Patient Care Team:  Marielena Domínguez MD as PCP - General (Family Medicine)  Brenton Zamora MD as Consulting Physician (Medical Oncology)  Marycarmen Ortega MD as Consulting Physician (Obstetrics and Gynecology)    Smoking Status:  Social History     Tobacco Use   Smoking Status Never Smoker       Alcohol Consumption:  Social History     Substance and Sexual Activity   Alcohol Use Yes    Comment: rare       Depression Screen:   PHQ-2/PHQ-9 Depression Screening 6/30/2021   Little interest or pleasure in doing things 0   Feeling down, depressed, or hopeless 0   Trouble falling or staying asleep, or sleeping too much -   Feeling tired or having little energy -   Poor appetite or overeating -   Feeling bad about yourself - or that you are a failure or have let yourself or your family down -   Trouble concentrating on things, such as reading the newspaper or watching television -   Moving or speaking so slowly that other people could have noticed. Or the opposite - being so fidgety or restless that you have been moving around a lot more than usual -   Thoughts that you would be better off dead, or of hurting yourself in some way -   Total Score 0       Fall Risk Screen:  DIMAS Fall Risk Assessment was completed, and patient is at LOW risk for falls.Assessment completed on:6/30/2021    Health Habits and Functional and Cognitive Screening:  Functional & Cognitive Status 6/30/2021   Do you have difficulty preparing food and eating? No   Do you have difficulty bathing yourself, getting dressed or grooming  yourself? No   Do you have difficulty using the toilet? No   Do you have difficulty moving around from place to place? No   Do you have trouble with steps or getting out of a bed or a chair? No   Current Diet -   Dental Exam -   Eye Exam -   Exercise (times per week) -   Current Exercise Activities Include -   Do you need help using the phone?  No   Are you deaf or do you have serious difficulty hearing?  No   Do you need help with transportation? No   Do you need help shopping? No   Do you need help preparing meals?  No   Do you need help with housework?  No   Do you need help with laundry? No   Do you need help taking your medications? No   Do you need help managing money? No   Do you ever drive or ride in a car without wearing a seat belt? No   Have you felt unusual stress, anger or loneliness in the last month? -   Who do you live with? -   If you need help, do you have trouble finding someone available to you? -   Have you been bothered in the last four weeks by sexual problems? -   Do you have difficulty concentrating, remembering or making decisions? -         Does the patient have evidence of cognitive impairment? No    Asprin use counseling:Taking ASA appropriately as indicated    Age-appropriate Screening Schedule:  Refer to the list below for future screening recommendations based on patient's age, sex and/or medical conditions. Orders for these recommended tests are listed in the plan section. The patient has been provided with a written plan.    Health Maintenance   Topic Date Due   • ZOSTER VACCINE (2 of 3) 01/04/2016   • DXA SCAN  10/27/2016   • INFLUENZA VACCINE  08/01/2021   • LIPID PANEL  06/23/2022   • PAP SMEAR  Discontinued   • TDAP/TD VACCINES  Discontinued          The following portions of the patient's history were reviewed and updated as appropriate: problem list.    Outpatient Medications Prior to Visit   Medication Sig Dispense Refill   • Ascorbic Acid (VITAMIN C) 100 MG tablet Take 100  "mg by mouth daily.     • Calcium Carbonate-Vitamin D (CALCIUM-VITAMIN D) 600-200 MG-UNIT capsule Take by mouth.     • Cyanocobalamin (VITAMIN B-12 PO) Take  by mouth.     • Multiple Vitamin (MULTIVITAMIN) tablet Take 1 tablet by mouth daily.     • fluticasone (FLONASE) 50 MCG/ACT nasal spray 2 sprays into the nostril(s) as directed by provider Daily. 1 bottle 3   • ZOLMitriptan (ZOMIG) 5 MG tablet TAKE ONE TABLET BY MOUTH AT ONSET OF HEADACHE; MAY REPEAT ONE TABLET IN 2 HOURS IF NEEDED. 12 tablet 3   • guaiFENesin (Mucinex) 600 MG 12 hr tablet Take 2 tablets by mouth 2 (Two) Times a Day.       No facility-administered medications prior to visit.       Patient Active Problem List   Diagnosis   • Chronic lymphocytic leukemia (CMS/HCC)   • Atypical migraine   • Osteoporosis       Advanced Care Planning:  No acp    Review of Systems   Constitutional: Negative for chills and fever.   HENT: Negative for congestion.    Respiratory: Negative for cough and wheezing.    Cardiovascular: Negative for chest pain.   Gastrointestinal: Negative for abdominal pain.   Genitourinary: Negative for vaginal discharge.   Musculoskeletal: Negative for back pain.   Skin: Negative for rash.   Neurological: Negative for weakness.   Psychiatric/Behavioral: Negative for confusion and dysphoric mood.       Compared to one year ago, the patient feels her physical health is the same.  Compared to one year ago, the patient feels her mental health is the same.    Reviewed chart for potential of high risk medication in the elderly: yes  Reviewed chart for potential of harmful drug interactions in the elderly:yes    Objective         Vitals:    06/30/21 0747   BP: 122/78   Pulse: 78   Temp: 97.8 °F (36.6 °C)   SpO2: 98%   Weight: 51.4 kg (113 lb 6.4 oz)   Height: 162.6 cm (64.02\")       Body mass index is 19.46 kg/m².  Discussed the patient's BMI with her. The BMI is in the acceptable range.    Physical Exam  Vitals and nursing note reviewed. "   Constitutional:       Appearance: She is well-developed.   HENT:      Head: Normocephalic and atraumatic.   Eyes:      Conjunctiva/sclera: Conjunctivae normal.      Pupils: Pupils are equal, round, and reactive to light.   Cardiovascular:      Rate and Rhythm: Normal rate and regular rhythm.      Heart sounds: Normal heart sounds. No murmur heard.     Pulmonary:      Effort: Pulmonary effort is normal. No respiratory distress.      Breath sounds: Normal breath sounds.   Abdominal:      General: Bowel sounds are normal.      Palpations: Abdomen is soft.   Musculoskeletal:         General: Normal range of motion.      Cervical back: Neck supple.   Skin:     General: Skin is warm and dry.   Neurological:      Mental Status: She is alert and oriented to person, place, and time.         Lab Results   Component Value Date    GLU 93 06/23/2021    CHLPL 207 (H) 06/23/2021    TRIG 66 06/23/2021    HDL 97 (H) 06/23/2021    LDL 98 06/23/2021    VLDL 12 06/23/2021    HGBA1C 5.60 06/23/2021        Assessment/Plan   Medicare Risks and Personalized Health Plan  CMS Preventative Services Quick Reference  Advance Directive Discussion  Breast Cancer/Mammogram Screening  Cardiovascular risk  Dementia/Memory   Depression/Dysphoria  Diabetic Lab Screening   Fall Risk  Glaucoma Risk  Hearing Problem  Immunizations Discussed/Encouraged (specific immunizations; COVID19 )  Inadequate Social Support, Isolation, Loneliness, Lack of Transportation, Financial Difficulties, or Caregiver Stress   Inactivity/Sedentary  Osteoporosis Risk  Polypharmacy    The above risks/problems have been discussed with the patient.  Pertinent information has been shared with the patient in the After Visit Summary.  Follow up plans and orders are seen below in the Assessment/Plan Section.    Diagnoses and all orders for this visit:    1. Medicare annual wellness visit, subsequent (Primary)    2. Migraine without aura and without status migrainosus, not  intractable  -     ZOLMitriptan (ZOMIG) 5 MG tablet; TAKE ONE TABLET BY MOUTH AT ONSET OF HEADACHE; MAY REPEAT ONE TABLET IN 2 HOURS IF NEEDED.  Dispense: 12 tablet; Refill: 6  -     fluticasone (Flonase) 50 MCG/ACT nasal spray; 2 sprays into the nostril(s) as directed by provider Daily.  Dispense: 15.8 mL; Refill: 3    3. Seasonal allergic rhinitis, unspecified trigger  -     ZOLMitriptan (ZOMIG) 5 MG tablet; TAKE ONE TABLET BY MOUTH AT ONSET OF HEADACHE; MAY REPEAT ONE TABLET IN 2 HOURS IF NEEDED.  Dispense: 12 tablet; Refill: 6  -     fluticasone (Flonase) 50 MCG/ACT nasal spray; 2 sprays into the nostril(s) as directed by provider Daily.  Dispense: 15.8 mL; Refill: 3      Follow Up:  6mo     An After Visit Summary and PPPS were given to the patient.

## 2022-01-11 ENCOUNTER — OFFICE VISIT (OUTPATIENT)
Dept: ORTHOPEDIC SURGERY | Facility: CLINIC | Age: 72
End: 2022-01-11

## 2022-01-11 VITALS — WEIGHT: 115 LBS | BODY MASS INDEX: 19.63 KG/M2 | HEIGHT: 64 IN | TEMPERATURE: 97.8 F

## 2022-01-11 DIAGNOSIS — R52 PAIN: Primary | ICD-10-CM

## 2022-01-11 DIAGNOSIS — S89.91XA KNEE INJURY, RIGHT, INITIAL ENCOUNTER: ICD-10-CM

## 2022-01-11 PROCEDURE — 73562 X-RAY EXAM OF KNEE 3: CPT | Performed by: NURSE PRACTITIONER

## 2022-01-11 PROCEDURE — 99214 OFFICE O/P EST MOD 30 MIN: CPT | Performed by: NURSE PRACTITIONER

## 2022-01-11 RX ORDER — CHLORAL HYDRATE 500 MG
CAPSULE ORAL
COMMUNITY

## 2022-01-11 NOTE — PROGRESS NOTES
Patient: Palak Berumen  YOB: 1950 71 y.o. female  Medical Record Number: [id number]    Chief Complaints:   Chief Complaint   Patient presents with   • Right Knee - Initial Evaluation, Pain       History of Present Illness:Palak Berumen is a 71 y.o. female who presents as a new patient both myself as well as to the practice with complaints of right knee pain.  Patient reports that she had an episode about a month ago where she had acute onset of severe medial knee pain and catching lasted for several days got better but 3 days ago she was going up some steps had a sharp stabbing pain medial aspect of right knee with catching and swelling.  It has persisted since.  She has tried over-the-counter anti-inflammatories with minimal improvement    Allergies: No Known Allergies    Medications:   Current Outpatient Medications   Medication Sig Dispense Refill   • Ascorbic Acid (VITAMIN C) 100 MG tablet Take 100 mg by mouth daily.     • Calcium Carbonate-Vitamin D (CALCIUM-VITAMIN D) 600-200 MG-UNIT capsule Take by mouth.     • Cyanocobalamin (VITAMIN B-12 PO) Take  by mouth.     • fluticasone (Flonase) 50 MCG/ACT nasal spray 2 sprays into the nostril(s) as directed by provider Daily. 15.8 mL 3   • Multiple Vitamin (MULTIVITAMIN) tablet Take 1 tablet by mouth daily.     • ZOLMitriptan (ZOMIG) 5 MG tablet TAKE ONE TABLET BY MOUTH AT ONSET OF HEADACHE; MAY REPEAT ONE TABLET IN 2 HOURS IF NEEDED. 12 tablet 6   • Omega-3 Fatty Acids (fish oil) 1000 MG capsule capsule Take  by mouth.       No current facility-administered medications for this visit.         The following portions of the patient's history were reviewed and updated as appropriate: allergies, current medications, past family history, past medical history, past social history, past surgical history and problem list.    Review of Systems:   A 14 point review of systems was performed. All systems negative except pertinent positives/negative listed  "in HPI above    Physical Exam:   Vitals:    01/11/22 1430   Temp: 97.8 °F (36.6 °C)   Weight: 52.2 kg (115 lb)   Height: 162.6 cm (64\")       General: A and O x 3, ASA, NAD    SCLERA:    Normal    Skin clear no unusual lesions noted  Right knee patient has trace amount of effusion noted with 116 degrees flexion neutral in extension with a positive medial Christ negative Lockman calf soft and nontender       Radiology:  Xrays 3views (ap,lateral, sunrise) right knee were ordered and reviewed today secondary to pain and were normal.  No compared to views available    Assessment/Plan: Severe right knee pain with mechanical symptoms following injury    Patient I discussed options, we will proceed with an MRI of the right knee given the fact that she has had severe medial knee pain that has not responded to conservative measures and she has mechanical symptoms.  I think most likely she has torn medial meniscus.  Patient will call me a couple days after regarding results and treatment options she was given a compression knee sleeve and will continue with anti-inflammatories as long as she is able to tolerate them      Jessica Schmitz, APRN  1/11/2022  "

## 2022-01-14 ENCOUNTER — TELEPHONE (OUTPATIENT)
Dept: ORTHOPEDIC SURGERY | Facility: CLINIC | Age: 72
End: 2022-01-14

## 2022-01-14 NOTE — TELEPHONE ENCOUNTER
Caller: Palak Berumen    Relationship to patient: Self    Best call back number:[phone]  Patient is needing: CALLBACK.  PATIENT IS CALLING TO CHECK THE STATUS OF MRI AUTHORIZATION AND SCHEDULING        UNABLE TO WARM TRANSFER

## 2022-01-26 ENCOUNTER — HOSPITAL ENCOUNTER (OUTPATIENT)
Dept: MRI IMAGING | Facility: HOSPITAL | Age: 72
End: 2022-01-26

## 2022-02-16 ENCOUNTER — TELEPHONE (OUTPATIENT)
Dept: ORTHOPEDIC SURGERY | Facility: CLINIC | Age: 72
End: 2022-02-16

## 2022-02-16 ENCOUNTER — HOSPITAL ENCOUNTER (OUTPATIENT)
Dept: MRI IMAGING | Facility: HOSPITAL | Age: 72
Discharge: HOME | End: 2022-02-16
Admitting: NURSE PRACTITIONER

## 2022-02-16 DIAGNOSIS — S89.91XA KNEE INJURY, RIGHT, INITIAL ENCOUNTER: ICD-10-CM

## 2022-02-16 PROCEDURE — 73721 MRI JNT OF LWR EXTRE W/O DYE: CPT

## 2022-02-16 NOTE — TELEPHONE ENCOUNTER
Informed pt that once [facility] is able to review the report someone from the office will be getting back with her. She gave verbal understanding.

## 2022-02-16 NOTE — TELEPHONE ENCOUNTER
Provider: FANNY THORNTON    Caller: PHIL MARTÍNEZ    Relationship to Patient: SELF    Phone Number: [phone]    Reason for Call: PT HAD MRI OF RIGHT KNEE TODAY (2/16) AND WAS TOLD TO CALL FANNY WHEN SHE'D DONE SO TO GO OVER RESULTS AND GET AN APPT WITH DR MARIA. PLEASE CALL HER BACK AT THE NUMBER ABOVE.

## 2022-02-18 NOTE — TELEPHONE ENCOUNTER
I have spoken with the patient about her MRI results and did recommend that she have an MRI with contrast to further assess the numerous small foci of marrow signal abnormality throughout the visualized osseous structures, but the patient declines.  Instead she would like to see Dr. Betsy Marquez as soon as possible to see if knee arthroscopy is an option for the bucket-handle tear of the medial meniscus.  Please schedule her to see Dr. Marquez next week to discuss results and possible knee arthroscopy

## 2022-02-18 NOTE — TELEPHONE ENCOUNTER
Dr. Marquez-May I add a patient to your schedule next week>? It will be an IHC for bucket-handle tear of the medial meniscus of the right knee

## 2022-02-18 NOTE — TELEPHONE ENCOUNTER
Beautiful, thank you I did open up Monday morning I will have Heena check and see when she is scheduled perhaps we could squeeze her in Monday morning thank you

## 2022-02-21 ENCOUNTER — OFFICE VISIT (OUTPATIENT)
Dept: ORTHOPEDIC SURGERY | Facility: CLINIC | Age: 72
End: 2022-02-21

## 2022-02-21 VITALS — WEIGHT: 115 LBS | TEMPERATURE: 95.9 F | HEIGHT: 64 IN | BODY MASS INDEX: 19.63 KG/M2

## 2022-02-21 DIAGNOSIS — S83.211A BUCKET-HANDLE TEAR OF MEDIAL MENISCUS OF RIGHT KNEE AS CURRENT INJURY, INITIAL ENCOUNTER: Primary | ICD-10-CM

## 2022-02-21 PROCEDURE — 99214 OFFICE O/P EST MOD 30 MIN: CPT | Performed by: ORTHOPAEDIC SURGERY

## 2022-02-21 NOTE — PROGRESS NOTES
New Knee      Patient: Palak Berumen        YOB: 1950    Medical Record Number: [id number]        Chief Complaints: Right knee pain      History of Present Illness: This is a 71-year-old female who presents complaining of right knee pain began about 5 to 6 weeks ago she was out walking she was going upstairs she thinks to at a time and all of a sudden noticed pain in her knee.  Since that time she has had intermittent pain is been limiting her particularly with walking no swelling her pain is medial she has no night pain no history of similar symptoms she is currently doing physical therapy which is helped a little bit but she still limited in her activity.  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  Her past medical history is marked for CLL, breast cancer, abnormal Pap smear, osteopenia        Allergies: No Known Allergies    Medications:   Home Medications:  Current Outpatient Medications on File Prior to Visit   Medication Sig   • Ascorbic Acid (VITAMIN C) 100 MG tablet Take 100 mg by mouth daily.   • Calcium Carbonate-Vitamin D (CALCIUM-VITAMIN D) 600-200 MG-UNIT capsule Take by mouth.   • Cyanocobalamin (VITAMIN B-12 PO) Take  by mouth.   • Multiple Vitamin (MULTIVITAMIN) tablet Take 1 tablet by mouth daily.   • Omega-3 Fatty Acids (fish oil) 1000 MG capsule capsule Take  by mouth.   • fluticasone (Flonase) 50 MCG/ACT nasal spray 2 sprays into the nostril(s) as directed by provider Daily.   • ZOLMitriptan (ZOMIG) 5 MG tablet TAKE ONE TABLET BY MOUTH AT ONSET OF HEADACHE; MAY REPEAT ONE TABLET IN 2 HOURS IF NEEDED.     No current facility-administered medications on file prior to visit.     Current Medications:  Scheduled Meds:  Continuous Infusions:No current facility-administered medications for this visit.    PRN Meds:.    Past Medical History:   Diagnosis Date   • Abnormal cervical Papanicolaou smear    • Allergic rhinitis    • Breast cancer  "(HCC)    • CLL (chronic lymphocytic leukemia) ([facility])     patient state stage zero, not currently treated (10/17/2017)   • Colon polyps 6/20/2019   • Herpes zoster    • Hypogammaglobulinemia (HCC)    • Migraine headache    • Osteopenia    • Polyp of sigmoid colon         Past Surgical History:   Procedure Laterality Date   • BREAST RECONSTRUCTION Left 1990   • COLONOSCOPY N/A 10/17/2016    Procedure: COLONOSCOPY TO CECUM WITH COLD BIOPSY POLYPECTOMY;  Surgeon: Anny Salamanca MD;  Location: [facility] ENDOSCOPY;  Service:    • COLONOSCOPY N/A 10/7/2019    Procedure: COLONOSCOPY INTO CECUM AND TI;  Surgeon: Anny Salamanca MD;  Location: [facility] ENDOSCOPY;  Service: General   • MASTECTOMY Left 1990   • MASTECTOMY Bilateral 1991        Social History     Occupational History   • Occupation:    Tobacco Use   • Smoking status: Never Smoker   • Smokeless tobacco: Never Used   Vaping Use   • Vaping Use: Never used   Substance and Sexual Activity   • Alcohol use: Yes     Comment: rare   • Drug use: No   • Sexual activity: Yes     Partners: Male     Comment:       Social History     Social History Narrative     -  retired in January    3 children - one grandchild    Nonsmoker    No regular churchgoing    Large supportive family        Family History   Problem Relation Age of Onset   • Lung cancer Father    • Dementia Father    • Colon cancer Brother              Review of Systems: 14 point review of systems remarkable for the right knee pain only the remainder negative per the patient    Review of Systems      Physical Exam: 71 y.o. female  General Appearance:    Alert, cooperative, in no acute distress                   Vitals:    02/21/22 1054   Temp: 95.9 °F (35.5 °C)   Weight: 52.2 kg (115 lb)   Height: 162.6 cm (64\")   PainSc:   4      Patient is alert and read ×3 no acute distress appears her above-listed at height weight and age.  Affect is normal respiratory rate is normal unlabored. Heart " "rate regular rate rhythm, sclera, dentition and hearing are normal for the purpose of this exam.        Ortho Exam  Physical exam of the right  knee reveals no effusion no redness.  The patient does have tenderness about the medial l joint line.  No tenderness about the lateral l joint line.  A negative bounce home and a positive l medial Christ.    Patient has a stable ligamentous exam.  The patient has a negative Lachman and negative anterior drawer and a negative pivot shift.  Quads are reasonable and symmetric bilaterally.  Calf is soft and nontender.  There is no overlying skin changes no lymphedema lymphadenopathy.  Patient has good hip range of motion full symmetric and asymptomatic and a normal ankle exam.  She has good distal pulses and sensation distally is intact    Physical Exam: 71 y.o. female  General Appearance:    Alert, cooperative, in no acute distress                      Vitals:    02/21/22 1054   Temp: 95.9 °F (35.5 °C)   Weight: 52.2 kg (115 lb)   Height: 162.6 cm (64\")   PainSc:   4        Head:    Normocephalic, without obvious abnormality, atraumatic   Eyes:            conjunctivae and sclerae normal, no pallor, corneas clear,    Ears:    Ears appear intact with no abnormalities noted   Throat:   No oral lesions, no thrush, oral mucosa moist   Neck:   No adenopathy, supple, trachea midline, no thyromegaly,    Back:     No kyphosis present, no scoliosis present, no skin lesions,      erythema or scars, no tenderness to percussion or                   palpation,   range of motion normal   Lungs:     Clear to auscultation,respirations regular, even and                  unlabored    Heart:    Regular rhythm and normal rate               Chest Wall:    No abnormalities observed               Pulses:   Pulses palpable and equal bilaterally   Skin:   No bleeding, bruising or rash   Lymph nodes:   No palpable adenopathy   Neurologic:   Appears neurologic intact       Procedures         "     Radiology:   AP, Lateral and merchant views of the right knee  were /reviewed to evauateknee pain.  These are normal neutral alignment good major joint space she also comes with an MRI which shows a bucket-handle tear of the medial meniscus she does have some marrow changes could be from the CLL metastatic disease and myeloma are in the differential an MRI with IV contrast was recommended however the patient wanted to have a discussion today first she is not opposed to it she does want a discussion  Imaging Results (Most Recent)     None        Assessment/Plan:    Right knee bucket-handle tear medial meniscus I had a very long discussion with she and her  regarding the natural history of meniscus tears regarding what a bucket-handle tear is also discussed the findings on MRI I am not sure what to make of a change in marrow signals I declined to feel like we need to proceed with an MR with IV contrast to rule out the above listed pathologies.  Dr. Zamora at [facility] is her oncologist she will call him today and go through the MRI results and see if he feels 1 where another about the MRI with contrast.  I did give her my cell number to give him should he need to have a conversation.  At any rate I think she does want to address the meniscus we will proceed with arthroscopy I discussed with in detail with her risk benefits and alternatives The patient voiced understanding of the risks, benefits, and alternative forms of treatment that were discussed and the patient consents to proceed with the above listed surgery.  All risks, benefits and alternatives were discussed.  Risks including to but not exclusive to anesthetic complications, including death, MI, CVA, infection, bleeding DVT, fracture, residual pain and need for future surgery.  She understands these she understands with her malignancy her risk are a bit higher also asked her to ask Dr. Zamora if he thinks she needs to be given anticoagulants for  short period of time after surgery

## 2022-05-24 NOTE — PROGRESS NOTES
Chief Complaint   Patient presents with   • Mass     Bottom of left foot       Subjective   Palak Berumen is a 72 y.o. female.     History of Present Illness   Acute add-on visit  Hx  CLL, orthopedic complaints    Concern for small subcutaneous nodule at the bottom of her left foot in the arch    She works on her feet daily.  She wears multiple shoe wear.  Tries to wear shoes with good support for better care of her back and joints    This nodule does not cause her pain.  No redness.  Has been present for over a month.  Has not significantly changed.  May have been present sooner.  No discomfort no redness.  Otherwise feels well without acute concerns        The following portions of the patient's history were reviewed and updated as appropriate: allergies, current medications, past family history, past medical history, past social history, past surgical history and problem list.      Past Medical History:   Diagnosis Date   • Abnormal cervical Papanicolaou smear    • Allergic rhinitis    • Breast cancer (HCC)    • CLL (chronic lymphocytic leukemia) (HCC)     patient state stage zero, not currently treated (10/17/2017)   • Colon polyps 6/20/2019   • Herpes zoster    • Hypogammaglobulinemia (HCC)    • Migraine headache    • Osteopenia    • Polyp of sigmoid colon        Past Surgical History:   Procedure Laterality Date   • BREAST RECONSTRUCTION Left 1990   • COLONOSCOPY N/A 10/17/2016    Procedure: COLONOSCOPY TO CECUM WITH COLD BIOPSY POLYPECTOMY;  Surgeon: Anny Salamanca MD;  Location: [facility] ENDOSCOPY;  Service:    • COLONOSCOPY N/A 10/7/2019    Procedure: COLONOSCOPY INTO CECUM AND TI;  Surgeon: Anny Salamanca MD;  Location: [facility] ENDOSCOPY;  Service: General   • MASTECTOMY Left 1990   • MASTECTOMY Bilateral 1991       Family History   Problem Relation Age of Onset   • Lung cancer Father    • Dementia Father    • Colon cancer Brother        Social History     Socioeconomic History   • Marital status:     Tobacco Use   • Smoking status: Never Smoker   • Smokeless tobacco: Never Used   Vaping Use   • Vaping Use: Never used   Substance and Sexual Activity   • Alcohol use: Yes     Comment: rare   • Drug use: No   • Sexual activity: Yes     Partners: Male     Comment:        Current Outpatient Medications on File Prior to Visit   Medication Sig Dispense Refill   • Ascorbic Acid (VITAMIN C) 100 MG tablet Take 100 mg by mouth daily.     • Calcium Carbonate-Vitamin D (CALCIUM-VITAMIN D) 600-200 MG-UNIT capsule Take by mouth.     • Cyanocobalamin (VITAMIN B-12 PO) Take  by mouth.     • fluticasone (Flonase) 50 MCG/ACT nasal spray 2 sprays into the nostril(s) as directed by provider Daily. 15.8 mL 3   • Multiple Vitamin (MULTIVITAMIN) tablet Take 1 tablet by mouth daily.     • Omega-3 Fatty Acids (fish oil) 1000 MG capsule capsule Take  by mouth.     • ZOLMitriptan (ZOMIG) 5 MG tablet TAKE ONE TABLET BY MOUTH AT ONSET OF HEADACHE; MAY REPEAT ONE TABLET IN 2 HOURS IF NEEDED. 12 tablet 6     No current facility-administered medications on file prior to visit.       Review of Systems   Constitutional: Negative for fever.   HENT: Negative for congestion.    Respiratory: Negative for shortness of breath.    Cardiovascular: Negative for chest pain.   Gastrointestinal: Negative for abdominal pain.   Genitourinary: Negative for decreased urine volume.   Musculoskeletal: Negative for back pain.   Neurological: Negative for weakness.           Vitals:    05/25/22 0944   BP: 120/76   Pulse: 63   Temp: 97.5 °F (36.4 °C)   SpO2: 100%      Objective   Physical Exam  Vitals reviewed.   Cardiovascular:      Pulses: Normal pulses.   Pulmonary:      Effort: Pulmonary effort is normal.   Abdominal:      General: Abdomen is flat.   Skin:     Capillary Refill: Capillary refill takes less than 2 seconds.   Neurological:      Mental Status: She is alert.         Fibrous nodule in arch of the foot    Assessment & Plan   Problems  Addressed this Visit    None     Visit Diagnoses     Subcutaneous nodule of left foot    -  Primary      Diagnoses       Codes Comments    Subcutaneous nodule of left foot    -  Primary ICD-10-CM: R22.42  ICD-9-CM: 782.2         More c/w plantar fibroma. Would observe for now. Can have Dermatologist eval as well. If changes or worsens let us know will place referral to [facility] to evaluate or could order ultrasound imaging            No follow-ups on file.      Marielena Domínguez MD

## 2022-05-25 ENCOUNTER — OFFICE VISIT (OUTPATIENT)
Dept: FAMILY MEDICINE CLINIC | Facility: CLINIC | Age: 72
End: 2022-05-25

## 2022-05-25 VITALS
SYSTOLIC BLOOD PRESSURE: 120 MMHG | WEIGHT: 113 LBS | BODY MASS INDEX: 19.29 KG/M2 | HEART RATE: 63 BPM | TEMPERATURE: 97.5 F | DIASTOLIC BLOOD PRESSURE: 76 MMHG | OXYGEN SATURATION: 100 % | HEIGHT: 64 IN

## 2022-05-25 DIAGNOSIS — R22.42 SUBCUTANEOUS NODULE OF LEFT FOOT: Primary | ICD-10-CM

## 2022-05-25 PROCEDURE — 99213 OFFICE O/P EST LOW 20 MIN: CPT | Performed by: FAMILY MEDICINE

## 2022-05-25 RX ORDER — ESTRADIOL 0.1 MG/G
CREAM VAGINAL
COMMUNITY
Start: 2022-05-16

## 2022-05-25 RX ORDER — CLOBETASOL PROPIONATE 0.5 MG/G
OINTMENT TOPICAL
COMMUNITY
Start: 2022-05-16

## 2022-06-23 DIAGNOSIS — Z00.00 MEDICARE ANNUAL WELLNESS VISIT, SUBSEQUENT: Primary | ICD-10-CM

## 2022-06-24 DIAGNOSIS — E78.2 MIXED HYPERLIPIDEMIA: ICD-10-CM

## 2022-06-24 DIAGNOSIS — E53.8 VITAMIN B12 DEFICIENCY: ICD-10-CM

## 2022-06-24 DIAGNOSIS — Z00.00 MEDICARE ANNUAL WELLNESS VISIT, SUBSEQUENT: Primary | ICD-10-CM

## 2022-06-24 DIAGNOSIS — R73.03 PREDIABETES: ICD-10-CM

## 2022-06-28 LAB
ALBUMIN SERPL-MCNC: 4.6 G/DL (ref 3.7–4.7)
ALBUMIN/GLOB SERPL: 2.4 {RATIO} (ref 1.2–2.2)
ALP SERPL-CCNC: 76 IU/L (ref 44–121)
ALT SERPL-CCNC: 18 IU/L (ref 0–32)
AST SERPL-CCNC: 28 IU/L (ref 0–40)
BASOPHILS # BLD AUTO: 0.1 X10E3/UL (ref 0–0.2)
BASOPHILS NFR BLD AUTO: 1 %
BILIRUB SERPL-MCNC: 0.4 MG/DL (ref 0–1.2)
BUN SERPL-MCNC: 10 MG/DL (ref 8–27)
BUN/CREAT SERPL: 14 (ref 12–28)
CALCIUM SERPL-MCNC: 9.5 MG/DL (ref 8.7–10.3)
CHLORIDE SERPL-SCNC: 101 MMOL/L (ref 96–106)
CHOLEST SERPL-MCNC: 204 MG/DL (ref 100–199)
CO2 SERPL-SCNC: 23 MMOL/L (ref 20–29)
CREAT SERPL-MCNC: 0.7 MG/DL (ref 0.57–1)
EGFRCR SERPLBLD CKD-EPI 2021: 92 ML/MIN/1.73
EOSINOPHIL # BLD AUTO: 0.1 X10E3/UL (ref 0–0.4)
EOSINOPHIL NFR BLD AUTO: 1 %
ERYTHROCYTE [DISTWIDTH] IN BLOOD BY AUTOMATED COUNT: 12 % (ref 11.7–15.4)
GLOBULIN SER CALC-MCNC: 1.9 G/DL (ref 1.5–4.5)
GLUCOSE SERPL-MCNC: 98 MG/DL (ref 65–99)
HBA1C MFR BLD: 5.8 % (ref 4.8–5.6)
HCT VFR BLD AUTO: 41.3 % (ref 34–46.6)
HDLC SERPL-MCNC: 93 MG/DL
HGB BLD-MCNC: 13.6 G/DL (ref 11.1–15.9)
IMM GRANULOCYTES # BLD AUTO: 0 X10E3/UL (ref 0–0.1)
IMM GRANULOCYTES NFR BLD AUTO: 0 %
LDLC SERPL CALC-MCNC: 98 MG/DL (ref 0–99)
LYMPHOCYTES # BLD AUTO: 8.6 X10E3/UL (ref 0.7–3.1)
LYMPHOCYTES NFR BLD AUTO: 67 %
MCH RBC QN AUTO: 32.4 PG (ref 26.6–33)
MCHC RBC AUTO-ENTMCNC: 32.9 G/DL (ref 31.5–35.7)
MCV RBC AUTO: 98 FL (ref 79–97)
MONOCYTES # BLD AUTO: 0.4 X10E3/UL (ref 0.1–0.9)
MONOCYTES NFR BLD AUTO: 4 %
NEUTROPHILS # BLD AUTO: 3.3 X10E3/UL (ref 1.4–7)
NEUTROPHILS NFR BLD AUTO: 27 %
PLATELET # BLD AUTO: 276 X10E3/UL (ref 150–450)
POTASSIUM SERPL-SCNC: 4.1 MMOL/L (ref 3.5–5.2)
PROT SERPL-MCNC: 6.5 G/DL (ref 6–8.5)
RBC # BLD AUTO: 4.2 X10E6/UL (ref 3.77–5.28)
SODIUM SERPL-SCNC: 139 MMOL/L (ref 134–144)
TRIGL SERPL-MCNC: 70 MG/DL (ref 0–149)
VIT B12 SERPL-MCNC: >2000 PG/ML (ref 232–1245)
VLDLC SERPL CALC-MCNC: 13 MG/DL (ref 5–40)
WBC # BLD AUTO: 12.5 X10E3/UL (ref 3.4–10.8)

## 2022-07-06 ENCOUNTER — OFFICE VISIT (OUTPATIENT)
Dept: FAMILY MEDICINE CLINIC | Facility: CLINIC | Age: 72
End: 2022-07-06

## 2022-07-06 VITALS
OXYGEN SATURATION: 98 % | HEART RATE: 62 BPM | WEIGHT: 115 LBS | HEIGHT: 64 IN | BODY MASS INDEX: 19.63 KG/M2 | DIASTOLIC BLOOD PRESSURE: 82 MMHG | SYSTOLIC BLOOD PRESSURE: 138 MMHG | TEMPERATURE: 97.3 F

## 2022-07-06 DIAGNOSIS — G43.009 MIGRAINE WITHOUT AURA AND WITHOUT STATUS MIGRAINOSUS, NOT INTRACTABLE: ICD-10-CM

## 2022-07-06 DIAGNOSIS — J30.2 SEASONAL ALLERGIC RHINITIS, UNSPECIFIED TRIGGER: ICD-10-CM

## 2022-07-06 DIAGNOSIS — Z00.00 ENCOUNTER FOR ANNUAL WELLNESS EXAM IN MEDICARE PATIENT: Primary | ICD-10-CM

## 2022-07-06 PROCEDURE — 1170F FXNL STATUS ASSESSED: CPT | Performed by: FAMILY MEDICINE

## 2022-07-06 PROCEDURE — G0439 PPPS, SUBSEQ VISIT: HCPCS | Performed by: FAMILY MEDICINE

## 2022-07-06 PROCEDURE — 1125F AMNT PAIN NOTED PAIN PRSNT: CPT | Performed by: FAMILY MEDICINE

## 2022-07-06 RX ORDER — FLUTICASONE PROPIONATE 50 MCG
2 SPRAY, SUSPENSION (ML) NASAL DAILY
Qty: 15.8 ML | Refills: 3 | Status: SHIPPED | OUTPATIENT
Start: 2022-07-06 | End: 2023-02-08 | Stop reason: SDUPTHER

## 2022-07-06 RX ORDER — ZOLMITRIPTAN 5 MG/1
TABLET, FILM COATED ORAL
Qty: 12 TABLET | Refills: 6 | Status: SHIPPED | OUTPATIENT
Start: 2022-07-06 | End: 2022-09-19

## 2022-07-06 NOTE — PROGRESS NOTES
The ABCs of the Annual Wellness Visit  Subsequent Medicare Wellness Visit    Chief Complaint   Patient presents with   • Medicare Wellness-subsequent      Subjective    History of Present Illness:  Palak Berumen is a 72 y.o. female who presents for a Subsequent Medicare Wellness Visit.    The following portions of the patient's history were reviewed and   updated as appropriate: past medical history, past social history, past surgical history and problem list.    Compared to one year ago, the patient feels her physical   health is the same.    Compared to one year ago, the patient feels her mental   health is the same.    pmh of CLL    A1c is 5.8 which is stable from 2 years ago.  Kidney and liver function are normal.  Blood counts are stable    Not on daily prescription medication.  Doing well     Recent Hospitalizations:  She was not admitted to the hospital during the last year.       Current Medical Providers:  Patient Care Team:  Marielena Domínguez MD as PCP - General (Family Medicine)  Brenton Zamora MD as Consulting Physician (Medical Oncology)  Marycarmen Ortega MD as Consulting Physician (Obstetrics and Gynecology)    Outpatient Medications Prior to Visit   Medication Sig Dispense Refill   • Ascorbic Acid (VITAMIN C) 100 MG tablet Take 100 mg by mouth daily.     • Calcium Carbonate-Vitamin D (CALCIUM-VITAMIN D) 600-200 MG-UNIT capsule Take by mouth.     • clobetasol (TEMOVATE) 0.05 % ointment      • Cyanocobalamin (VITAMIN B-12 PO) Take  by mouth.     • estradiol (ESTRACE) 0.1 MG/GM vaginal cream      • Multiple Vitamin (MULTIVITAMIN) tablet Take 1 tablet by mouth daily.     • Omega-3 Fatty Acids (fish oil) 1000 MG capsule capsule Take  by mouth.     • fluticasone (Flonase) 50 MCG/ACT nasal spray 2 sprays into the nostril(s) as directed by provider Daily. 15.8 mL 3   • ZOLMitriptan (ZOMIG) 5 MG tablet TAKE ONE TABLET BY MOUTH AT ONSET OF HEADACHE; MAY REPEAT ONE TABLET IN 2 HOURS IF NEEDED. 12  "tablet 6     No facility-administered medications prior to visit.       No opioid medication identified on active medication list. I have reviewed chart for other potential  high risk medication/s and harmful drug interactions in the elderly.          Aspirin is not on active medication list.  Aspirin use is not indicated based on review of current medical condition/s. Risk of harm outweighs potential benefits.  .    Patient Active Problem List   Diagnosis   • Chronic lymphocytic leukemia (HCC)   • Atypical migraine   • Osteoporosis     Advance Care Planning  Advance Directive is not on file.     Review of Systems   Constitutional: Negative for fever.   HENT: Negative for congestion.    Respiratory: Negative for shortness of breath.    Cardiovascular: Negative for chest pain.   Musculoskeletal: Negative for back pain.   Skin: Negative for rash.   Neurological: Negative for weakness.   Psychiatric/Behavioral: Negative for decreased concentration.        Objective    Vitals:    07/06/22 0752   BP: 138/82   Pulse: 62   Temp: 97.3 °F (36.3 °C)   SpO2: 98%   Weight: 52.2 kg (115 lb)   Height: 162.6 cm (64.02\")     Estimated body mass index is 19.73 kg/m² as calculated from the following:    Height as of this encounter: 162.6 cm (64.02\").    Weight as of this encounter: 52.2 kg (115 lb).    BMI is within normal parameters. No other follow-up for BMI required.      Does the patient have evidence of cognitive impairment? No    Physical Exam  Vitals reviewed.   Eyes:      Pupils: Pupils are equal, round, and reactive to light.   Cardiovascular:      Rate and Rhythm: Normal rate and regular rhythm.      Pulses: Normal pulses.   Pulmonary:      Effort: Pulmonary effort is normal.   Abdominal:      General: Abdomen is flat.   Skin:     General: Skin is warm.      Capillary Refill: Capillary refill takes less than 2 seconds.   Neurological:      General: No focal deficit present.      Mental Status: She is alert.   Psychiatric:  "        Mood and Affect: Mood normal.       Lab Results   Component Value Date    CHLPL 204 (H) 06/27/2022    TRIG 70 06/27/2022    HDL 93 06/27/2022    LDL 98 06/27/2022    VLDL 13 06/27/2022    HGBA1C 5.8 (H) 06/27/2022            HEALTH RISK ASSESSMENT    Smoking Status:  Social History     Tobacco Use   Smoking Status Never Smoker   Smokeless Tobacco Never Used     Alcohol Consumption:  Social History     Substance and Sexual Activity   Alcohol Use Yes    Comment: rare     Fall Risk Screen:    GEEADI Fall Risk Assessment has not been completed.    Depression Screening:  PHQ-2/PHQ-9 Depression Screening 7/6/2022   Retired PHQ-9 Total Score -   Retired Total Score -   Little Interest or Pleasure in Doing Things 0-->not at all   Feeling Down, Depressed or Hopeless 0-->not at all   PHQ-9: Brief Depression Severity Measure Score 0       Health Habits and Functional and Cognitive Screening:  Functional & Cognitive Status 6/30/2021   Do you have difficulty preparing food and eating? No   Do you have difficulty bathing yourself, getting dressed or grooming yourself? No   Do you have difficulty using the toilet? No   Do you have difficulty moving around from place to place? No   Do you have trouble with steps or getting out of a bed or a chair? No   Current Diet -   Dental Exam -   Eye Exam -   Exercise (times per week) -   Current Exercise Activities Include -   Do you need help using the phone?  No   Are you deaf or do you have serious difficulty hearing?  No   Do you need help with transportation? No   Do you need help shopping? No   Do you need help preparing meals?  No   Do you need help with housework?  No   Do you need help with laundry? No   Do you need help taking your medications? No   Do you need help managing money? No   Do you ever drive or ride in a car without wearing a seat belt? No   Have you felt unusual stress, anger or loneliness in the last month? -   Who do you live with? -   If you need help, do you  have trouble finding someone available to you? -   Have you been bothered in the last four weeks by sexual problems? -   Do you have difficulty concentrating, remembering or making decisions? -       Age-appropriate Screening Schedule:  Refer to the list below for future screening recommendations based on patient's age, sex and/or medical conditions. Orders for these recommended tests are listed in the plan section. The patient has been provided with a written plan.    Health Maintenance   Topic Date Due   • ZOSTER VACCINE (2 of 3) 01/04/2016   • DXA SCAN  10/27/2016   • INFLUENZA VACCINE  10/01/2022   • LIPID PANEL  06/27/2023   • PAP SMEAR  Discontinued   • TDAP/TD VACCINES  Discontinued              Assessment & Plan   CMS Preventative Services Quick Reference  Risk Factors Identified During Encounter  Cardiovascular Disease  Dementia/Memory   Depression/Dysphoria  Inactivity/Sedentary  Obesity/Overweight   The above risks/problems have been discussed with the patient.  Follow up actions/plans if indicated are seen below in the Assessment/Plan Section.  Pertinent information has been shared with the patient in the After Visit Summary.    Diagnoses and all orders for this visit:    1. Encounter for annual wellness exam in Medicare patient (Primary)    2. Migraine without aura and without status migrainosus, not intractable  -     ZOLMitriptan (ZOMIG) 5 MG tablet; TAKE ONE TABLET BY MOUTH AT ONSET OF HEADACHE; MAY REPEAT ONE TABLET IN 2 HOURS IF NEEDED.  Dispense: 12 tablet; Refill: 6  -     fluticasone (Flonase) 50 MCG/ACT nasal spray; 2 sprays into the nostril(s) as directed by provider Daily.  Dispense: 15.8 mL; Refill: 3    3. Seasonal allergic rhinitis, unspecified trigger  -     ZOLMitriptan (ZOMIG) 5 MG tablet; TAKE ONE TABLET BY MOUTH AT ONSET OF HEADACHE; MAY REPEAT ONE TABLET IN 2 HOURS IF NEEDED.  Dispense: 12 tablet; Refill: 6  -     fluticasone (Flonase) 50 MCG/ACT nasal spray; 2 sprays into the  nostril(s) as directed by provider Daily.  Dispense: 15.8 mL; Refill: 3        Follow Up:   F/u 1 year AMW    An After Visit Summary and PPPS were made available to the patient.

## 2022-09-19 DIAGNOSIS — G43.009 MIGRAINE WITHOUT AURA AND WITHOUT STATUS MIGRAINOSUS, NOT INTRACTABLE: ICD-10-CM

## 2022-09-19 DIAGNOSIS — J30.2 SEASONAL ALLERGIC RHINITIS, UNSPECIFIED TRIGGER: ICD-10-CM

## 2022-09-19 RX ORDER — ZOLMITRIPTAN 5 MG/1
TABLET, FILM COATED ORAL
Qty: 12 TABLET | Refills: 2 | Status: SHIPPED | OUTPATIENT
Start: 2022-09-19 | End: 2023-02-08 | Stop reason: SDUPTHER

## 2023-02-08 ENCOUNTER — OFFICE VISIT (OUTPATIENT)
Dept: FAMILY MEDICINE CLINIC | Facility: CLINIC | Age: 73
End: 2023-02-08
Payer: MEDICARE

## 2023-02-08 VITALS
SYSTOLIC BLOOD PRESSURE: 146 MMHG | OXYGEN SATURATION: 98 % | HEIGHT: 64 IN | HEART RATE: 97 BPM | TEMPERATURE: 97.3 F | DIASTOLIC BLOOD PRESSURE: 70 MMHG | BODY MASS INDEX: 19.29 KG/M2 | WEIGHT: 113 LBS

## 2023-02-08 DIAGNOSIS — E55.9 VITAMIN D DEFICIENCY: ICD-10-CM

## 2023-02-08 DIAGNOSIS — C91.10 CHRONIC LYMPHOCYTIC LEUKEMIA: Primary | ICD-10-CM

## 2023-02-08 DIAGNOSIS — M85.80 OSTEOPENIA, UNSPECIFIED LOCATION: ICD-10-CM

## 2023-02-08 DIAGNOSIS — Z23 NEED FOR VACCINATION: ICD-10-CM

## 2023-02-08 DIAGNOSIS — G43.009 MIGRAINE WITHOUT AURA AND WITHOUT STATUS MIGRAINOSUS, NOT INTRACTABLE: ICD-10-CM

## 2023-02-08 DIAGNOSIS — G43.009 ATYPICAL MIGRAINE: ICD-10-CM

## 2023-02-08 DIAGNOSIS — R03.0 ELEVATED BP WITHOUT DIAGNOSIS OF HYPERTENSION: ICD-10-CM

## 2023-02-08 DIAGNOSIS — E53.8 B12 DEFICIENCY: ICD-10-CM

## 2023-02-08 DIAGNOSIS — R73.03 PREDIABETES: ICD-10-CM

## 2023-02-08 DIAGNOSIS — M81.8 OTHER OSTEOPOROSIS WITHOUT CURRENT PATHOLOGICAL FRACTURE: ICD-10-CM

## 2023-02-08 DIAGNOSIS — R79.9 ABNORMAL FINDING OF BLOOD CHEMISTRY, UNSPECIFIED: ICD-10-CM

## 2023-02-08 DIAGNOSIS — J30.2 SEASONAL ALLERGIC RHINITIS, UNSPECIFIED TRIGGER: ICD-10-CM

## 2023-02-08 DIAGNOSIS — N81.10 BLADDER PROLAPSE, FEMALE, ACQUIRED: ICD-10-CM

## 2023-02-08 PROCEDURE — 90677 PCV20 VACCINE IM: CPT | Performed by: STUDENT IN AN ORGANIZED HEALTH CARE EDUCATION/TRAINING PROGRAM

## 2023-02-08 PROCEDURE — 99214 OFFICE O/P EST MOD 30 MIN: CPT | Performed by: STUDENT IN AN ORGANIZED HEALTH CARE EDUCATION/TRAINING PROGRAM

## 2023-02-08 PROCEDURE — G0009 ADMIN PNEUMOCOCCAL VACCINE: HCPCS | Performed by: STUDENT IN AN ORGANIZED HEALTH CARE EDUCATION/TRAINING PROGRAM

## 2023-02-08 RX ORDER — ZOLMITRIPTAN 5 MG/1
5 TABLET, FILM COATED ORAL ONCE
Qty: 12 TABLET | Refills: 2 | Status: SHIPPED | OUTPATIENT
Start: 2023-02-08 | End: 2023-02-08

## 2023-02-08 RX ORDER — FLUTICASONE PROPIONATE 50 MCG
2 SPRAY, SUSPENSION (ML) NASAL DAILY
Qty: 15.8 ML | Refills: 3 | Status: SHIPPED | OUTPATIENT
Start: 2023-02-08

## 2023-02-08 NOTE — ASSESSMENT & PLAN NOTE
Hx of osteopenia on previous DXA scan.  Has not had one in many years.  She does take Calcium and Vit D daily and gets regular weight bearing exercise.  She is not interested in medications for osteoporosis so is unsure if she will decide to repeat DXA. If she decides to repeat, will have done with her GYN office and have report sent to us.

## 2023-02-08 NOTE — PROGRESS NOTES
"Chief Complaint  Establish Care (Refill all meds)    Subjective        Palak Berumen presents to [facility]  History of Present Illness  72-year-old female with CLL, prolapsed bladder, osteopenia, atypical migraines who presents to establish care today.    Previously saw Dr. Domínguez.  She has no acute complaints today.  All of her medical problems are stable.  She sees Dr. Zamora at [place] who is her hematologist-oncologist yearly.    She has also seen Dr. Escobar at [facility] for prolapsed bladder. Improved at PT. She uses vaginal estrogen a few times per week. Dr. Ortega is her regular GYN who she also sees yearly.           Objective   Vital Signs:  /70 (BP Location: Right arm, Patient Position: Sitting, Cuff Size: Adult)   Pulse 97   Temp 97.3 °F (36.3 °C) (Infrared)   Ht 162.6 cm (64\")   Wt 51.3 kg (113 lb)   SpO2 98%   BMI 19.40 kg/m²   Estimated body mass index is 19.4 kg/m² as calculated from the following:    Height as of this encounter: 162.6 cm (64\").    Weight as of this encounter: 51.3 kg (113 lb).       BMI is within normal parameters. No other follow-up for BMI required.      Physical Exam  Constitutional:       General: She is not in acute distress.  Eyes:      Conjunctiva/sclera: Conjunctivae normal.   Cardiovascular:      Rate and Rhythm: Normal rate and regular rhythm.   Pulmonary:      Effort: Pulmonary effort is normal. No respiratory distress.      Breath sounds: Normal breath sounds.   Skin:     General: Skin is warm and dry.   Neurological:      Mental Status: She is alert and oriented to person, place, and time.   Psychiatric:         Mood and Affect: Mood normal.         Behavior: Behavior normal.        Result Review :  The following data was reviewed by: Rebecca Fernández MD on 02/08/2023:  Common labs    Common Labs 6/27/22 6/27/22 6/27/22 6/27/22 8/29/22    0754 [id number]    Glucose  98      BUN  10      Creatinine  0.70      Sodium  " Demetra Soto 6/18/2020 6:11 AM CDT    This message was received as a [facility] message from the patient. Please review the message and respond to the patient.   ----- Message -----  From: Carlee Olmos  Sent: 6/17/2020 5:06 PM CDT  To: [facility] Default Message Pool  Subject: RE: Office Visit with Dr. Bernard     Yes, I can. I'll see you then. Thanks! Alice    ----- Message -----  From: Em ALICEA  Sent: 6/17/20 4:38 PM  To: Carlee Olmos  Subject: Office Visit with Dr. Joana Bejarano,    Are you available this Friday in [place] 6/19/2020 at 10:30 am to see Dr. Bernard?    Merle PARK       139      Potassium  4.1      Chloride  101      Calcium  9.5      Total Protein  6.5      Albumin  4.6      Total Bilirubin  0.4      Alkaline Phosphatase  76      AST (SGOT)  28      ALT (SGPT)  18      WBC   12.5 (A)  13.33 (A)   Hemoglobin   13.6  12.5   Hematocrit   41.3  37.6   Platelets   276  227   Total Cholesterol 204 (A)       Triglycerides 70       HDL Cholesterol 93       LDL Cholesterol  98       Hemoglobin A1C    5.8 (A)    (A) Abnormal value       Comments are available for some flowsheets but are not being displayed.           Data reviewed: Hematology/Oncology note reviewed from most recent visit in the fall - diagnosis of CLL - no indication for tx at this time. Monitor on yearly basis.             Assessment and Plan   Diagnoses and all orders for this visit:    1. Chronic lymphocytic leukemia (HCC) (Primary)  Assessment & Plan:  Stable. Elevated WBC count. Asymptomatic.  Follows with Dr. Zamora at [place] yearly.  No indication for treatment at this time.  Continue to monitor.      2. Atypical migraine  Assessment & Plan:  Improved from when she was younger but still fairly regular.   Resolves with zomig. Continue.        3. Elevated BP without diagnosis of hypertension  Comments:  /70. Repeat 140/68. Will continue to monitor. Goal <140/90.    4. Migraine without aura and without status migrainosus, not intractable  -     fluticasone (Flonase) 50 MCG/ACT nasal spray; 2 sprays into the nostril(s) as directed by provider Daily.  Dispense: 15.8 mL; Refill: 3  -     ZOLMitriptan (ZOMIG) 5 MG tablet; Take 1 tablet by mouth 1 (One) Time for 1 dose. AT ONSET OF HEADACHE MAY REPEAT ONE TABLET IN 2 HOURS IF NEEDED  Dispense: 12 tablet; Refill: 2    5. Seasonal allergic rhinitis, unspecified trigger  -     fluticasone (Flonase) 50 MCG/ACT nasal spray; 2 sprays into the nostril(s) as directed by provider Daily.  Dispense: 15.8 mL; Refill: 3  -     ZOLMitriptan (ZOMIG) 5 MG tablet; Take 1 tablet by  mouth 1 (One) Time for 1 dose. AT ONSET OF HEADACHE MAY REPEAT ONE TABLET IN 2 HOURS IF NEEDED  Dispense: 12 tablet; Refill: 2    6. Need for vaccination  -     Pneumococcal Conjugate Vaccine 20-Valent (PCV20)    7. Bladder prolapse, female, acquired  Assessment & Plan:  Sees Dr. Escobar at [facility]  Symptoms improved with pelvic PT.  Uses vaginal estrogen 2-3x weekly. Continue.      8. Osteopenia, unspecified location  Assessment & Plan:  Hx of osteopenia on previous DXA scan.  Has not had one in many years.  She does take Calcium and Vit D daily and gets regular weight bearing exercise.  She is not interested in medications for osteoporosis so is unsure if she will decide to repeat DXA. If she decides to repeat, will have done with her GYN office and have report sent to us.      I spent 30 minutes caring for Oanh on this date of service. This time includes time spent by me in the following activities:reviewing tests, performing a medically appropriate examination and/or evaluation , counseling and educating the patient/family/caregiver, ordering medications, tests, or procedures and documenting information in the medical record       Follow Up   Return in about 8 months (around 10/8/2023) for Medicare Wellness.  Patient was given instructions and counseling regarding her condition or for health maintenance advice. Please see specific information pulled into the AVS if appropriate.

## 2023-02-08 NOTE — ASSESSMENT & PLAN NOTE
Stable. Elevated WBC count. Asymptomatic.  Follows with Dr. Zamora at [place] yearly.  No indication for treatment at this time.  Continue to monitor.

## 2023-02-08 NOTE — ASSESSMENT & PLAN NOTE
Sees Dr. Escobar at [facility]  Symptoms improved with pelvic PT.  Uses vaginal estrogen 2-3x weekly. Continue.

## 2023-02-15 ENCOUNTER — PATIENT ROUNDING (BHMG ONLY) (OUTPATIENT)
Dept: FAMILY MEDICINE CLINIC | Facility: CLINIC | Age: 73
End: 2023-02-15
Payer: MEDICARE

## 2023-02-15 NOTE — PROGRESS NOTES
February 15, 2023    Hello, may I speak with Palak Berumen?    My name is Belkys Ventura      I am  with [facility]  [address] [place] [id number] [phone].    Before we get started may I verify your date of birth? 1950    I am calling to officially welcome you to our practice and ask about your recent visit. Is this a good time to talk? No, my chart message sent

## 2023-04-21 NOTE — OP NOTE
Colonoscopy Procedure Note  Palak Berumen  1950  Date of Procedure: 10/07/19    Pre-operative Diagnosis:    · Screening  · brother with colon cancer death at age 50  · History of hyperplastic polyps in 2002 and 2013, but a tubular adenoma in 2016  · history of breast cancer and CLL    Post-operative Diagnosis:  · Diverticulosis, mild, sigmoid    Procedure: Colonoscopy with terminal ileoscopy     Findings/Treatments:   · Diverticulosis, mild, sigmoid       Recommendations:   · Colonoscopy in 5 years.    · Review diverticulosis info given today.  · Keep a copy of the photographs of the procedure given to you today for possible need for reference in the future.    Surgeon: Cheikh    Anesthetic: MAC per Sammy Sandoval MD    Indications or Associated Issues:  none    Scope Withdrawal Time:  7 minutes  26 seconds    Procedure Details     MAC anesthesia was induced.  The 180 Colonoscopy was inserted blindly into the rectum and advanced to the cecum, with relative ease,  without need for pressure, lift, or turning.    Cecum was identified by the appendiceal orifice and the ileocecal valve and photographed for documentation.  Terminal ileum was intubated and was normal.    Prep quality was excellent.  A careful inspection was made as the scope was withdrawn, including a retroflexed view of the rectum; there was no suggestion of presence of angiodysplasias, colitis, polyps but there were the diverticula, with no interventions.     Retroflexion in the rectum revealed no abnormalities.    Anny Salamanca MD  10/07/19  7:29 AM         Pharmacy- Renal Dose Adjustment    Patient Active Problem List   Diagnosis     Anemia due to acute blood loss     Backache     Chronic, continuous use of opioids     Controlled substance agreement updated 12-     Fever, postprocedural     GERD     Essential hypertension     Prostate cancer (H)     Non morbid obesity due to excess calories     Other specified causes of urethral stricture     Pelvic pain in male     Personal history of prostate cancer s/p prostatectomy and sEBRT     Mixed hyperlipidemia     Spinal stenosis of lumbar region with neurogenic claudication     Rising PSA following treatment for malignant neoplasm of prostate     Aortic valve stenosis with insufficiency, etiology of cardiac valve disease unspecified     Encounter for examination required by Department of Transportation (DOT)     Mild aortic stenosis     Ascending aortic aneurysm-moderate at 4.6 cm on 3/11/20     Aortic valve insufficiency-moderate to severe     Hx of syncope     History of tobacco abuse quitting 11/8/2002     Hypertriglyceridemia     Mild pulmonary hypertension (H)     Palpitations     Plaque in heart artery-aorta     Diabetes mellitus, type 2 (H)     Syncope     Malignant neoplasm of prostate (H)     Status post total right knee replacement     Aneurysm of ascending aorta without rupture (H)     Bone metastasis     Bone metastases     Neutropenic fever (H)     Acute kidney failure, unspecified (H)     Infection due to Klebsiella pneumoniae        Relevant Labs:  Recent Labs   Lab Test 04/21/23  0535 04/20/23  0550   WBC 1.8* 2.1*   HGB 6.4* 8.3*   * 154        CrCl: 20.7 mL/min      Intake/Output Summary (Last 24 hours) at 4/21/2023 1155  Last data filed at 4/21/2023 1046  Gross per 24 hour   Intake 2806 ml   Output -275 ml   Net 3081 ml          Per Renal Dose Adjustment Protocol, will adjust:  Lyrica to 75 mg bid per policy (home dose 150 mg bid).        Will continue to follow and make adjustments  accordingly. Thank You.    Kimmie Rose [facility] ....................  4/21/2023   11:55 AM

## 2023-09-11 DIAGNOSIS — R73.03 PREDIABETES: ICD-10-CM

## 2023-09-11 DIAGNOSIS — R03.0 ELEVATED BP WITHOUT DIAGNOSIS OF HYPERTENSION: ICD-10-CM

## 2023-09-11 DIAGNOSIS — E53.8 B12 DEFICIENCY: ICD-10-CM

## 2023-09-11 DIAGNOSIS — C91.10 CHRONIC LYMPHOCYTIC LEUKEMIA: ICD-10-CM

## 2023-09-11 DIAGNOSIS — M85.80 OSTEOPENIA, UNSPECIFIED LOCATION: ICD-10-CM

## 2023-09-11 DIAGNOSIS — E55.9 VITAMIN D DEFICIENCY: ICD-10-CM

## 2023-09-11 DIAGNOSIS — R79.9 ABNORMAL FINDING OF BLOOD CHEMISTRY, UNSPECIFIED: ICD-10-CM

## 2023-09-11 DIAGNOSIS — M81.8 OTHER OSTEOPOROSIS WITHOUT CURRENT PATHOLOGICAL FRACTURE: ICD-10-CM

## 2023-10-03 LAB
25(OH)D3+25(OH)D2 SERPL-MCNC: 86.9 NG/ML (ref 30–100)
ALBUMIN SERPL-MCNC: 5 G/DL (ref 3.5–5.2)
ALBUMIN/GLOB SERPL: 3.8 G/DL
ALP SERPL-CCNC: 80 U/L (ref 39–117)
ALT SERPL-CCNC: 22 U/L (ref 1–33)
AST SERPL-CCNC: 28 U/L (ref 1–32)
BASOPHILS # BLD AUTO: ABNORMAL 10*3/UL
BILIRUB SERPL-MCNC: 0.4 MG/DL (ref 0–1.2)
BUN SERPL-MCNC: 13 MG/DL (ref 8–23)
BUN/CREAT SERPL: 21.7 (ref 7–25)
CALCIUM SERPL-MCNC: 9.8 MG/DL (ref 8.6–10.5)
CHLORIDE SERPL-SCNC: 104 MMOL/L (ref 98–107)
CHOLEST SERPL-MCNC: 206 MG/DL (ref 0–200)
CO2 SERPL-SCNC: 28.7 MMOL/L (ref 22–29)
CREAT SERPL-MCNC: 0.6 MG/DL (ref 0.57–1)
DIFFERENTIAL COMMENT: ABNORMAL
EGFRCR SERPLBLD CKD-EPI 2021: 94.9 ML/MIN/1.73
EOSINOPHIL # BLD AUTO: ABNORMAL 10*3/UL
EOSINOPHIL # BLD MANUAL: 0.49 10*3/MM3 (ref 0–0.4)
EOSINOPHIL NFR BLD AUTO: ABNORMAL %
EOSINOPHIL NFR BLD MANUAL: 3.2 % (ref 0.3–6.2)
ERYTHROCYTE [DISTWIDTH] IN BLOOD BY AUTOMATED COUNT: 11.8 % (ref 12.3–15.4)
FOLATE SERPL-MCNC: >20 NG/ML (ref 4.78–24.2)
GLOBULIN SER CALC-MCNC: 1.3 GM/DL
GLUCOSE SERPL-MCNC: 102 MG/DL (ref 65–99)
HBA1C MFR BLD: 5.8 % (ref 4.8–5.6)
HCT VFR BLD AUTO: 37.9 % (ref 34–46.6)
HDLC SERPL-MCNC: 92 MG/DL (ref 40–60)
HGB BLD-MCNC: 12.6 G/DL (ref 12–15.9)
LDLC SERPL CALC-MCNC: 98 MG/DL (ref 0–100)
LYMPHOCYTES # BLD AUTO: ABNORMAL 10*3/UL
LYMPHOCYTES # BLD MANUAL: 7.84 10*3/MM3 (ref 0.7–3.1)
LYMPHOCYTES NFR BLD AUTO: ABNORMAL %
LYMPHOCYTES NFR BLD MANUAL: 51.1 % (ref 19.6–45.3)
MCH RBC QN AUTO: 32.2 PG (ref 26.6–33)
MCHC RBC AUTO-ENTMCNC: 33.2 G/DL (ref 31.5–35.7)
MCV RBC AUTO: 96.9 FL (ref 79–97)
MONOCYTES # BLD MANUAL: 0.98 10*3/MM3 (ref 0.1–0.9)
MONOCYTES NFR BLD AUTO: ABNORMAL %
MONOCYTES NFR BLD MANUAL: 6.4 % (ref 5–12)
NEUTROPHILS # BLD MANUAL: 6.04 10*3/MM3 (ref 1.7–7)
NEUTROPHILS NFR BLD AUTO: ABNORMAL %
NEUTROPHILS NFR BLD MANUAL: 39.4 % (ref 42.7–76)
PLATELET # BLD AUTO: 264 10*3/MM3 (ref 140–450)
PLATELET BLD QL SMEAR: ABNORMAL
POTASSIUM SERPL-SCNC: 4.4 MMOL/L (ref 3.5–5.2)
PROT SERPL-MCNC: 6.3 G/DL (ref 6–8.5)
RBC # BLD AUTO: 3.91 10*6/MM3 (ref 3.77–5.28)
RBC MORPH BLD: ABNORMAL
SODIUM SERPL-SCNC: 141 MMOL/L (ref 136–145)
TRIGL SERPL-MCNC: 92 MG/DL (ref 0–150)
TSH SERPL DL<=0.005 MIU/L-ACNC: 2.2 UIU/ML (ref 0.27–4.2)
VIT B12 SERPL-MCNC: >2000 PG/ML (ref 211–946)
VLDLC SERPL CALC-MCNC: 16 MG/DL (ref 5–40)
WBC # BLD AUTO: 15.34 10*3/MM3 (ref 3.4–10.8)

## 2023-10-11 ENCOUNTER — OFFICE VISIT (OUTPATIENT)
Dept: FAMILY MEDICINE CLINIC | Facility: CLINIC | Age: 73
End: 2023-10-11
Payer: MEDICARE

## 2023-10-11 VITALS
WEIGHT: 114 LBS | TEMPERATURE: 97.4 F | BODY MASS INDEX: 19.46 KG/M2 | HEART RATE: 76 BPM | OXYGEN SATURATION: 98 % | DIASTOLIC BLOOD PRESSURE: 60 MMHG | HEIGHT: 64 IN | SYSTOLIC BLOOD PRESSURE: 132 MMHG

## 2023-10-11 DIAGNOSIS — Z00.00 MEDICARE ANNUAL WELLNESS VISIT, SUBSEQUENT: Primary | ICD-10-CM

## 2023-10-11 DIAGNOSIS — Z12.11 SCREENING FOR MALIGNANT NEOPLASM OF COLON: ICD-10-CM

## 2023-10-11 DIAGNOSIS — Z80.0 FAMILY HISTORY OF COLON CANCER: ICD-10-CM

## 2023-10-11 PROCEDURE — 1170F FXNL STATUS ASSESSED: CPT | Performed by: STUDENT IN AN ORGANIZED HEALTH CARE EDUCATION/TRAINING PROGRAM

## 2023-10-11 PROCEDURE — 1160F RVW MEDS BY RX/DR IN RCRD: CPT | Performed by: STUDENT IN AN ORGANIZED HEALTH CARE EDUCATION/TRAINING PROGRAM

## 2023-10-11 PROCEDURE — 1159F MED LIST DOCD IN RCRD: CPT | Performed by: STUDENT IN AN ORGANIZED HEALTH CARE EDUCATION/TRAINING PROGRAM

## 2023-10-11 PROCEDURE — G0439 PPPS, SUBSEQ VISIT: HCPCS | Performed by: STUDENT IN AN ORGANIZED HEALTH CARE EDUCATION/TRAINING PROGRAM

## 2023-10-11 NOTE — ASSESSMENT & PLAN NOTE
Colonoscopy: last 2019, brother with colon cancer - repeat 5yrs  LDCT: never smoker  Mammogram: N/A, bilateral mastecomy  DEXA: 10/27/14 with osteoporosis in right hip, prescribed Evista, not taking.     Immunizations: eligible for Shingrex (had Zostavax)  Labs: ordered today  The 10-year ASCVD risk score (Tayla GARCIA, et al., 2019) is: 13.4%    Values used to calculate the score:      Age: 73 years      Sex: Female      Is Non- : No      Diabetic: No      Tobacco smoker: No      Systolic Blood Pressure: 132 mmHg      Is BP treated: No      HDL Cholesterol: 92 mg/dL      Total Cholesterol: 206 mg/dL      Patient was counseled in regards to maintaining a healthy lifestyle, rich in whole grains, fruits and vegetables. Limit high saturated fats and processed sugars. Maintain an active lifestyle to promote overall health and well being.

## 2023-10-11 NOTE — PROGRESS NOTES
The ABCs of the Annual Wellness Visit  Subsequent Medicare Wellness Visit    Subjective      Palak Berumen is a 73 y.o. female who presents for a Subsequent Medicare Wellness Visit.    The following portions of the patient's history were reviewed and   updated as appropriate: allergies, current medications, past family history, past medical history, past social history, past surgical history, and problem list.    Compared to one year ago, the patient feels her physical   health is the same.    Compared to one year ago, the patient feels her mental   health is the same.    Recent Hospitalizations:  She was not admitted to the hospital during the last year.       Current Medical Providers:  Patient Care Team:  Rebecca Fernández MD as PCP - General (Family Medicine)  Brenton Zamora MD as Consulting Physician (Medical Oncology)  Marycarmen Ortega MD as Consulting Physician (Obstetrics and Gynecology)    Outpatient Medications Prior to Visit   Medication Sig Dispense Refill    Ascorbic Acid (VITAMIN C) 100 MG tablet Take 1 tablet by mouth Daily.      Calcium Carbonate-Vitamin D (CALCIUM-VITAMIN D) 600-200 MG-UNIT capsule Take by mouth.      clobetasol (TEMOVATE) 0.05 % ointment       Cyanocobalamin (VITAMIN B-12 PO) Take  by mouth.      estradiol (ESTRACE) 0.1 MG/GM vaginal cream       fluticasone (Flonase) 50 MCG/ACT nasal spray 2 sprays into the nostril(s) as directed by provider Daily. 15.8 mL 3    Multiple Vitamin (MULTIVITAMIN) tablet Take 1 tablet by mouth Daily.      Omega-3 Fatty Acids (fish oil) 1000 MG capsule capsule Take  by mouth.      ZOLMitriptan (ZOMIG) 5 MG tablet Take 1 tablet by mouth 1 (One) Time for 1 dose. AT ONSET OF HEADACHE MAY REPEAT ONE TABLET IN 2 HOURS IF NEEDED 12 tablet 2     No facility-administered medications prior to visit.       No opioid medication identified on active medication list. I have reviewed chart for other potential  high risk medication/s and harmful drug  "interactions in the elderly.        Aspirin is not on active medication list.  Aspirin use is not indicated based on review of current medical condition/s. Risk of harm outweighs potential benefits.  .    Patient Active Problem List   Diagnosis    Chronic lymphocytic leukemia    Atypical migraine    Osteopenia    Bladder prolapse, female, acquired    Medicare annual wellness visit, subsequent     Advance Care Planning   Advance Care Planning     Advance Directive is not on file.  ACP discussion was held with the patient during this visit. Patient has an advance directive (not in EMR), copy requested.     Objective    Vitals:    10/11/23 0749   BP: 132/60   BP Location: Right arm   Patient Position: Sitting   Cuff Size: Adult   Pulse: 76   Temp: 97.4 øF (36.3 øC)   TempSrc: Infrared   SpO2: 98%   Weight: 51.7 kg (114 lb)   Height: 162.6 cm (64\")   PainSc: 0-No pain     Estimated body mass index is 19.57 kg/mý as calculated from the following:    Height as of this encounter: 162.6 cm (64\").    Weight as of this encounter: 51.7 kg (114 lb).    BMI is within normal parameters. No other follow-up for BMI required.      Does the patient have evidence of cognitive impairment?   No    Lab Results   Component Value Date    CHLPL 206 (H) 10/02/2023    TRIG 92 10/02/2023    HDL 92 (H) 10/02/2023    LDL 98 10/02/2023    VLDL 16 10/02/2023    HGBA1C 5.80 (H) 10/02/2023          HEALTH RISK ASSESSMENT    Smoking Status:  Social History     Tobacco Use   Smoking Status Never    Passive exposure: Never   Smokeless Tobacco Never     Alcohol Consumption:  Social History     Substance and Sexual Activity   Alcohol Use Not Currently    Comment: rare     Fall Risk Screen:    STEADI Fall Risk Assessment was completed, and patient is at LOW risk for falls.Assessment completed on:10/11/2023    Depression Screening:      10/11/2023     7:52 AM   PHQ-2/PHQ-9 Depression Screening   Little Interest or Pleasure in Doing Things 0-->not at all "   Feeling Down, Depressed or Hopeless 0-->not at all   PHQ-9: Brief Depression Severity Measure Score 0       Health Habits and Functional and Cognitive Screening:      10/11/2023     7:53 AM   Functional & Cognitive Status   Do you have difficulty preparing food and eating? No   Do you have difficulty bathing yourself, getting dressed or grooming yourself? No   Do you have difficulty using the toilet? No   Do you have difficulty moving around from place to place? No   Do you have trouble with steps or getting out of a bed or a chair? No   Current Diet Well Balanced Diet   Dental Exam Up to date   Eye Exam Up to date   Exercise (times per week) 7 times per week   Current Exercises Include Walking;Weightlifting   Do you need help using the phone?  No   Are you deaf or do you have serious difficulty hearing?  No   Do you need help to go to places out of walking distance? No   Do you need help shopping? No   Do you need help preparing meals?  No   Do you need help with housework?  No   Do you need help with laundry? No   Do you need help taking your medications? No   Do you need help managing money? No   Do you ever drive or ride in a car without wearing a seat belt? No   Have you felt unusual stress, anger or loneliness in the last month? No   Who do you live with? Spouse   If you need help, do you have trouble finding someone available to you? No   Have you been bothered in the last four weeks by sexual problems? No   Do you have difficulty concentrating, remembering or making decisions? No       Age-appropriate Screening Schedule:  Refer to the list below for future screening recommendations based on patient's age, sex and/or medical conditions. Orders for these recommended tests are listed in the plan section. The patient has been provided with a written plan.    Health Maintenance   Topic Date Due    ZOSTER VACCINE (1 of 2) 01/04/2016    DXA SCAN  10/27/2016    COVID-19 Vaccine (6 - 2023-24 season) 09/01/2023     LIPID PANEL  10/02/2024    COLORECTAL CANCER SCREENING  10/07/2024    ANNUAL WELLNESS VISIT  10/11/2024    HEPATITIS C SCREENING  Completed    INFLUENZA VACCINE  Completed    Pneumococcal Vaccine 65+  Completed    PAP SMEAR  Discontinued    TDAP/TD VACCINES  Discontinued                  CMS Preventative Services Quick Reference  Risk Factors Identified During Encounter:    Immunizations Discussed/Encouraged: COVID19  Dental Screening Recommended  Vision Screening Recommended  DEXA screening discussed.    The above risks/problems have been discussed with the patient.  Pertinent information has been shared with the patient in the After Visit Summary.    Diagnoses and all orders for this visit:    1. Medicare annual wellness visit, subsequent (Primary)  Assessment & Plan:  Colonoscopy: last 2019, brother with colon cancer - repeat 5yrs  LDCT: never smoker  Mammogram: N/A, bilateral mastecomy  DEXA: 10/27/14 with osteoporosis in right hip, prescribed Evista, not taking.     Immunizations: eligible for Shingrex (had Zostavax)  Labs: ordered today  The 10-year ASCVD risk score (Tayla GARCIA, et al., 2019) is: 13.4%    Values used to calculate the score:      Age: 73 years      Sex: Female      Is Non- : No      Diabetic: No      Tobacco smoker: No      Systolic Blood Pressure: 132 mmHg      Is BP treated: No      HDL Cholesterol: 92 mg/dL      Total Cholesterol: 206 mg/dL      Patient was counseled in regards to maintaining a healthy lifestyle, rich in whole grains, fruits and vegetables. Limit high saturated fats and processed sugars. Maintain an active lifestyle to promote overall health and well being.         2. Screening for malignant neoplasm of colon  -     Ambulatory Referral For Screening Colonoscopy    3. Family history of colon cancer  -     Ambulatory Referral For Screening Colonoscopy        Follow Up:   Next Medicare Wellness visit to be scheduled in 1 year.      An After Visit Summary  and PPPS were made available to the patient.

## 2023-11-01 ENCOUNTER — PREP FOR SURGERY (OUTPATIENT)
Dept: OTHER | Facility: HOSPITAL | Age: 73
End: 2023-11-01
Payer: MEDICARE

## 2023-11-01 DIAGNOSIS — Z80.0 FAMILY HISTORY OF COLON CANCER: Primary | ICD-10-CM

## 2023-11-01 DIAGNOSIS — Z86.010 HISTORY OF ADENOMATOUS POLYP OF COLON: ICD-10-CM

## 2024-01-16 PROBLEM — Z86.010 HISTORY OF ADENOMATOUS POLYP OF COLON: Status: ACTIVE | Noted: 2023-11-01

## 2024-01-16 PROBLEM — Z86.0101 HISTORY OF ADENOMATOUS POLYP OF COLON: Status: ACTIVE | Noted: 2023-11-01

## 2024-01-16 PROBLEM — Z80.0 FAMILY HISTORY OF COLON CANCER: Status: ACTIVE | Noted: 2023-11-01

## 2024-03-11 ENCOUNTER — HOSPITAL ENCOUNTER (OUTPATIENT)
Facility: HOSPITAL | Age: 74
Setting detail: HOSPITAL OUTPATIENT SURGERY
Discharge: HOME | End: 2024-03-11
Attending: SURGERY | Admitting: SURGERY
Payer: MEDICARE

## 2024-03-11 ENCOUNTER — ANESTHESIA (OUTPATIENT)
Dept: GASTROENTEROLOGY | Facility: HOSPITAL | Age: 74
End: 2024-03-11
Payer: MEDICARE

## 2024-03-11 ENCOUNTER — ANESTHESIA EVENT (OUTPATIENT)
Dept: GASTROENTEROLOGY | Facility: HOSPITAL | Age: 74
End: 2024-03-11
Payer: MEDICARE

## 2024-03-11 VITALS
RESPIRATION RATE: 16 BRPM | HEIGHT: 64 IN | WEIGHT: 111 LBS | HEART RATE: 66 BPM | DIASTOLIC BLOOD PRESSURE: 70 MMHG | OXYGEN SATURATION: 100 % | SYSTOLIC BLOOD PRESSURE: 159 MMHG | BODY MASS INDEX: 18.95 KG/M2

## 2024-03-11 PROCEDURE — G0105 COLORECTAL SCRN; HI RISK IND: HCPCS | Performed by: SURGERY

## 2024-03-11 PROCEDURE — 25010000002 PROPOFOL 10 MG/ML EMULSION: Performed by: NURSE ANESTHETIST, CERTIFIED REGISTERED

## 2024-03-11 PROCEDURE — 25810000003 LACTATED RINGERS PER 1000 ML: Performed by: SURGERY

## 2024-03-11 PROCEDURE — 25010000002 GLUCAGON (RDNA) PER 1 MG: Performed by: SURGERY

## 2024-03-11 RX ORDER — SODIUM CHLORIDE, SODIUM LACTATE, POTASSIUM CHLORIDE, CALCIUM CHLORIDE 600; 310; 30; 20 MG/100ML; MG/100ML; MG/100ML; MG/100ML
30 INJECTION, SOLUTION INTRAVENOUS CONTINUOUS PRN
Status: DISCONTINUED | OUTPATIENT
Start: 2024-03-11 | End: 2024-03-11 | Stop reason: HOSPADM

## 2024-03-11 RX ORDER — IBUPROFEN 600 MG/1
TABLET ORAL AS NEEDED
Status: DISCONTINUED | OUTPATIENT
Start: 2024-03-11 | End: 2024-03-11 | Stop reason: HOSPADM

## 2024-03-11 RX ORDER — LIDOCAINE HYDROCHLORIDE 20 MG/ML
INJECTION, SOLUTION INFILTRATION; PERINEURAL AS NEEDED
Status: DISCONTINUED | OUTPATIENT
Start: 2024-03-11 | End: 2024-03-11 | Stop reason: SURG

## 2024-03-11 RX ORDER — PROPOFOL 10 MG/ML
VIAL (ML) INTRAVENOUS CONTINUOUS PRN
Status: DISCONTINUED | OUTPATIENT
Start: 2024-03-11 | End: 2024-03-11 | Stop reason: SURG

## 2024-03-11 RX ADMIN — PROPOFOL 200 MCG/KG/MIN: 10 INJECTION, EMULSION INTRAVENOUS at 08:26

## 2024-03-11 RX ADMIN — PROPOFOL 40 MG: 10 INJECTION, EMULSION INTRAVENOUS at 08:27

## 2024-03-11 RX ADMIN — PROPOFOL 30 MG: 10 INJECTION, EMULSION INTRAVENOUS at 08:31

## 2024-03-11 RX ADMIN — LIDOCAINE HYDROCHLORIDE 60 MG: 20 INJECTION, SOLUTION INFILTRATION; PERINEURAL at 08:26

## 2024-03-11 RX ADMIN — SODIUM CHLORIDE, POTASSIUM CHLORIDE, SODIUM LACTATE AND CALCIUM CHLORIDE 30 ML/HR: 600; 310; 30; 20 INJECTION, SOLUTION INTRAVENOUS at 07:41

## 2024-03-11 NOTE — H&P
Cc: Endoscopy Visit    HPI: 73 y.o. female here for screening with hyperplastic polyp 2002 and 2013, with tubular adenoma 2016.  Her brother was 48 when he was diagnosed with metastatic death within a year.      Past Medical History:   Diagnosis Date    Abnormal cervical Papanicolaou smear     Allergic rhinitis     Breast cancer     CLL (chronic lymphocytic leukemia)     patient state stage zero, not currently treated (10/17/2017)    Colon polyps 06/20/2019    Herpes zoster     History of prolapse of bladder 2020    Hypogammaglobulinemia     Migraine headache     Osteopenia     Polyp of sigmoid colon     PONV (postoperative nausea and vomiting)        Past Surgical History:   Procedure Laterality Date    BREAST RECONSTRUCTION Left 1990    IMPLANTS REMOVED    COLONOSCOPY N/A 10/17/2016    Procedure: COLONOSCOPY TO CECUM WITH COLD BIOPSY POLYPECTOMY;  Surgeon: Anny Salamanca MD;  Location:  [facility];  Service:     COLONOSCOPY N/A 10/07/2019    Procedure: COLONOSCOPY INTO CECUM AND TI;  Surgeon: Anny Salamanca MD;  Location:  [facility];  Service: General    MASTECTOMY Left 1990    MASTECTOMY Bilateral 1991    TUBAL ABDOMINAL LIGATION         has No Known Allergies.       Medication List        ASK your doctor about these medications      ascorbic acid 100 MG tablet  Commonly known as: VITAMIN C     Calcium-Vitamin D 600-200 MG-UNIT capsule     clobetasol 0.05 % ointment  Commonly known as: TEMOVATE     estradiol 0.1 MG/GM vaginal cream  Commonly known as: ESTRACE     fish oil 1000 MG capsule capsule     fluticasone 50 MCG/ACT nasal spray  Commonly known as: Flonase  2 sprays into the nostril(s) as directed by provider Daily.     multivitamin tablet  Generic drug: multivitamin     VITAMIN B-12 PO     ZOLMitriptan 5 MG tablet  Commonly known as: ZOMIG  Take 1 tablet by mouth 1 (One) Time for 1 dose. AT ONSET OF HEADACHE MAY REPEAT ONE TABLET IN 2 HOURS IF NEEDED              Family History   Problem  Relation Age of Onset    Lung cancer Father     Dementia Father     Colon cancer Brother     Malig Hyperthermia Neg Hx        Social History     Socioeconomic History    Marital status:    Tobacco Use    Smoking status: Never     Passive exposure: Never    Smokeless tobacco: Never   Vaping Use    Vaping status: Never Used   Substance and Sexual Activity    Alcohol use: Not Currently     Comment: rare    Drug use: No    Sexual activity: Not Currently     Partners: Male     Comment:        Vitals:    03/11/24 0733   BP: 161/87   Pulse: 70   Resp: 16   SpO2: 100%       Body mass index is 19.05 kg/m².      Physical Exam    General: No acute distress  Lungs: No labored breathing, Pulse oximetry on room air is 100%.  Heart/EKG: RRR  Abdomen: no complaints of pain  Mental:  Awake, alert, and oriented    Imp:     Screening  hyperplastic polyp 2002 and 2013  History of tubular adenoma 2016  brother was 48 when he was diagnosed with metastatic death within a year.       Plan:  RACHELE Salamanca MD  08:24 EDT

## 2024-03-11 NOTE — ANESTHESIA PREPROCEDURE EVALUATION
Anesthesia Evaluation     Patient summary reviewed and Nursing notes reviewed   history of anesthetic complications:  PONV               Airway   Mallampati: II  TM distance: >3 FB  Neck ROM: full  Dental      Pulmonary - negative pulmonary ROS   Cardiovascular - negative cardio ROS    ECG reviewed  Rhythm: regular  Rate: normal        Neuro/Psych  (+) headaches  GI/Hepatic/Renal/Endo - negative ROS     Musculoskeletal (-) negative ROS    Abdominal    Substance History - negative use     OB/GYN negative ob/gyn ROS         Other   blood dyscrasia,   history of cancer remission                  Anesthesia Plan    ASA 2     MAC     intravenous induction     Anesthetic plan, risks, benefits, and alternatives have been provided, discussed and informed consent has been obtained with: patient.    Plan discussed with CRNA.    CODE STATUS:

## 2024-03-11 NOTE — ANESTHESIA POSTPROCEDURE EVALUATION
Patient: Palak Berumen    Procedure Summary       Date: 03/11/24 Room / Location: [facility] ENDOSCOPY 1 / [facility] ENDOSCOPY    Anesthesia Start: 0823 Anesthesia Stop: 0847    Procedure: COLONOSCOPY TO CECUM Diagnosis:       Family history of colon cancer      History of adenomatous polyp of colon      (Family history of colon cancer [Z80.0])      (History of adenomatous polyp of colon [Z86.010])    Surgeons: Anny Salamanca MD Provider: Marito Morris MD    Anesthesia Type: MAC ASA Status: 2            Anesthesia Type: MAC    Vitals  Vitals Value Taken Time   /69 03/11/24 0856   Temp     Pulse 72 03/11/24 0858   Resp 16 03/11/24 0856   SpO2 100 % 03/11/24 0858   Vitals shown include unfiled device data.        Post Anesthesia Care and Evaluation    Patient location during evaluation: PACU  Patient participation: complete - patient participated  Level of consciousness: awake and alert  Pain management: adequate    Airway patency: patent  Anesthetic complications: No anesthetic complications    Cardiovascular status: acceptable  Respiratory status: acceptable  Hydration status: acceptable    Comments: --------------------            03/11/24               0856     --------------------   BP:       144/69     Pulse:      65       Resp:       16       SpO2:      100%     --------------------

## 2024-03-11 NOTE — OP NOTE
"Colonoscopy Procedure Note  Palak Berumen  1950  Date of Procedure: 03/11/24    Pre-operative Diagnosis:    Screening  hyperplastic polyp 2002 and 2013  History of tubular adenoma 2016  brother was 48 when he was diagnosed with metastatic death within a year.    Patient preferring to be on the \"safer side\" for screening    Post-operative Diagnosis:  Sigmoid diverticulosis    Procedure: Colonoscopy         Recommendations:   Colonoscopy in 5 years, based on patient's preference  Review diverticulosis info given today.  Keep a copy of the photographs of the procedure given to you today for possible need for reference in the future.      Surgeon: Cheikh    Anesthetic: MAC per Marito Morris MD    Scope Withdrawal Time:  7 minutes  27 seconds    Procedure Details     MAC anesthesia was induced.  The 180 Colonoscopy was inserted blindly into the rectum and advanced to the cecum, with relative ease,  without need for pressure, lift, or turning.    Cecum was identified by the appendiceal orifice and the ileocecal valve and photographed for documentation.      Prep quality was excellent.  A careful inspection was made as the scope was withdrawn, including a retroflexed view of the rectum; there was no suggestion of presence of angiodysplasias, colitis, or polyps but there were the diverticula, with no interventions.   There was what was thought perhaps a polyp at one point, but further evaluation with the cold biopsy forceps led us to the diagnosis of diverticulum in the sigmoid.      Retroflexion in the rectum revealed no abnormalities.      Anny Salamanca MD  03/11/24    "

## 2024-03-13 ENCOUNTER — OFFICE VISIT (OUTPATIENT)
Dept: FAMILY MEDICINE CLINIC | Facility: CLINIC | Age: 74
End: 2024-03-13
Payer: MEDICARE

## 2024-03-13 VITALS
BODY MASS INDEX: 19.29 KG/M2 | HEIGHT: 64 IN | DIASTOLIC BLOOD PRESSURE: 76 MMHG | OXYGEN SATURATION: 98 % | SYSTOLIC BLOOD PRESSURE: 124 MMHG | TEMPERATURE: 98 F | HEART RATE: 87 BPM | WEIGHT: 113 LBS

## 2024-03-13 DIAGNOSIS — R22.0 CHEEK SWELLING: Primary | ICD-10-CM

## 2024-03-13 DIAGNOSIS — L08.9 SKIN INFECTION: ICD-10-CM

## 2024-03-13 PROCEDURE — 99213 OFFICE O/P EST LOW 20 MIN: CPT | Performed by: NURSE PRACTITIONER

## 2024-03-13 PROCEDURE — 1160F RVW MEDS BY RX/DR IN RCRD: CPT | Performed by: NURSE PRACTITIONER

## 2024-03-13 PROCEDURE — 1159F MED LIST DOCD IN RCRD: CPT | Performed by: NURSE PRACTITIONER

## 2024-03-13 RX ORDER — CEPHALEXIN 500 MG/1
500 CAPSULE ORAL 2 TIMES DAILY
Qty: 14 CAPSULE | Refills: 0 | Status: SHIPPED | OUTPATIENT
Start: 2024-03-13 | End: 2024-03-20

## 2024-03-13 NOTE — PROGRESS NOTES
"Chief Complaint  Face redness (Facial redness started Monday, warm to touch. Pt stated colonoscopy prep Sunday. )    Subjective        Palak Berumen presents to [facility]  History of Present Illness  Palak Berumen is a 73 y.o. female who presents to the clinic today with facial redness. Her symptoms started 2 days ago.  She denies rhinorrhea, fever, chills, or nasal congestion.  She denies exposure to sick children.  No issues with teeth.  No new face washes or make-up.  She did recently take Dulcolax and MiraLAX with Gatorade 3 days ago prior to her colonoscopy.  She has not had this type of bowel prep before.  The rash is not itchy.  She denies itchy throat or swelling in the throat.  The rash worsens as the day goes on. The rash is red and warm to the touch. She has tried Neosporin as well as hydrocortisone.       Objective   Vital Signs:  /76   Pulse 87   Temp 98 °F (36.7 °C)   Ht 162.6 cm (64.02\")   Wt 51.3 kg (113 lb)   SpO2 98%   BMI 19.39 kg/m²   Estimated body mass index is 19.39 kg/m² as calculated from the following:    Height as of this encounter: 162.6 cm (64.02\").    Weight as of this encounter: 51.3 kg (113 lb).       BMI is within normal parameters. No other follow-up for BMI required.      Physical Exam  Vitals reviewed.   Constitutional:       General: She is not in acute distress.     Appearance: Normal appearance. She is not ill-appearing, toxic-appearing or diaphoretic.   HENT:      Head: Normocephalic and atraumatic.      Left Ear: Tympanic membrane, ear canal and external ear normal.      Mouth/Throat:      Pharynx: No oropharyngeal exudate or posterior oropharyngeal erythema.   Skin:     General: Skin is warm.      Findings: Erythema (Bilateral cheeks, see images) present.   Neurological:      General: No focal deficit present.      Mental Status: She is alert and oriented to person, place, and time.      Motor: No weakness.      Gait: Gait " normal.   Psychiatric:         Mood and Affect: Mood normal.         Behavior: Behavior normal.                Result Review :                     Assessment and Plan     Diagnoses and all orders for this visit:    1. Cheek swelling (Primary)    2. Skin infection  -     cephalexin (KEFLEX) 500 MG capsule; Take 1 capsule by mouth 2 (Two) Times a Day for 7 days.  Dispense: 14 capsule; Refill: 0           Unclear etiology of patient's symptoms.  Given warmth and swelling, will start antibiotic treatment.  Could also be allergic given recent Dulcolax and MiraLAX bowel prep.  Recommended starting Zyrtec or Claritin in the evening.  She will follow-up with PCP in 1 week and call if symptoms worsen.         Follow Up     Return in about 1 week (around 3/20/2024) for Recheck.  Patient was given instructions and counseling regarding her condition or for health maintenance advice. Please see specific information pulled into the AVS if appropriate.     Electronically signed by CHAYO Tafoya, 03/13/24, 9:25 AM EDT.

## 2024-03-25 ENCOUNTER — OFFICE VISIT (OUTPATIENT)
Dept: FAMILY MEDICINE CLINIC | Facility: CLINIC | Age: 74
End: 2024-03-25
Payer: MEDICARE

## 2024-03-25 VITALS
BODY MASS INDEX: 19.43 KG/M2 | DIASTOLIC BLOOD PRESSURE: 72 MMHG | OXYGEN SATURATION: 98 % | SYSTOLIC BLOOD PRESSURE: 150 MMHG | WEIGHT: 113.8 LBS | HEART RATE: 76 BPM | HEIGHT: 64 IN | TEMPERATURE: 97.7 F

## 2024-03-25 DIAGNOSIS — L71.9 ROSACEA: Primary | ICD-10-CM

## 2024-03-25 DIAGNOSIS — R03.0 ELEVATED BLOOD PRESSURE READING IN OFFICE WITHOUT DIAGNOSIS OF HYPERTENSION: ICD-10-CM

## 2024-03-25 PROCEDURE — 1159F MED LIST DOCD IN RCRD: CPT | Performed by: STUDENT IN AN ORGANIZED HEALTH CARE EDUCATION/TRAINING PROGRAM

## 2024-03-25 PROCEDURE — 99214 OFFICE O/P EST MOD 30 MIN: CPT | Performed by: STUDENT IN AN ORGANIZED HEALTH CARE EDUCATION/TRAINING PROGRAM

## 2024-03-25 PROCEDURE — 1160F RVW MEDS BY RX/DR IN RCRD: CPT | Performed by: STUDENT IN AN ORGANIZED HEALTH CARE EDUCATION/TRAINING PROGRAM

## 2024-03-25 RX ORDER — METRONIDAZOLE 10 MG/G
GEL TOPICAL DAILY
Qty: 60 G | Refills: 0 | Status: SHIPPED | OUTPATIENT
Start: 2024-03-25

## 2024-03-25 NOTE — PROGRESS NOTES
"Chief Complaint  Red cheek (Follow up redness in left cheek)    Subjective        Palak Berumen presents to [facility]  History of Present Illness  72-year-old female with CLL, prolapsed bladder, osteopenia, atypical migraines who presents for follow up of rash on face.    She had colonoscopy - developed red rash on bilateral cheeks. Saw Radha about a month ago and was treated for cellulitis - symptoms improved somewhat but then recently returned. Not as severe. There is some associated warmth. There is no associated pain. No fevers.    She has been under increased stress. Tried neosporin on it without relief. Did not notice effect of zyrtec.           Objective   Vital Signs:  /72 (BP Location: Right arm, Patient Position: Sitting, Cuff Size: Adult)   Pulse 76   Temp 97.7 °F (36.5 °C)   Ht 162.6 cm (64\")   Wt 51.6 kg (113 lb 12.8 oz)   SpO2 98%   BMI 19.53 kg/m²   Estimated body mass index is 19.53 kg/m² as calculated from the following:    Height as of this encounter: 162.6 cm (64\").    Weight as of this encounter: 51.6 kg (113 lb 12.8 oz).       BMI is within normal parameters. No other follow-up for BMI required.      Physical Exam  Constitutional:       General: She is not in acute distress.  Eyes:      Conjunctiva/sclera: Conjunctivae normal.   Cardiovascular:      Rate and Rhythm: Normal rate and regular rhythm.   Pulmonary:      Effort: Pulmonary effort is normal. No respiratory distress.      Breath sounds: Normal breath sounds.   Skin:     General: Skin is warm and dry.      Comments: Erythema of bilateral cheeks, R>L. Associated warmth, telangiectasias.    Neurological:      Mental Status: She is alert and oriented to person, place, and time.   Psychiatric:         Mood and Affect: Mood normal.         Behavior: Behavior normal.        Result Review :    The following data was reviewed by: Rebecca Fernández MD on 03/25/2024:  Common labs          8/21/2023    " 12:54 10/2/2023    08:23   Common Labs   Glucose  102    BUN  13    Creatinine  0.60    Sodium  141    Potassium  4.4    Chloride  104    Calcium  9.8    Total Protein  6.3    Albumin  5.0    Total Bilirubin  0.4    Alkaline Phosphatase  80    AST (SGOT)  28    ALT (SGPT)  22    WBC 13.31     15.34    Hemoglobin 12.1     12.6    Hematocrit 36.1     37.9    Platelets 234     264    Total Cholesterol  206    Triglycerides  92    HDL Cholesterol  92    LDL Cholesterol   98    Hemoglobin A1C  5.80       Details          This result is from an external source.             Data reviewed : none             Assessment and Plan     Diagnoses and all orders for this visit:    1. Rosacea (Primary)  Comments:  I am not convinced this is infectious in nature. Would be unusual to be bilateral. Also no pain, fever. No inciting event such as wound, acne, etc. Tx as below.  Orders:  -     metroNIDAZOLE (Metrogel) 1 % gel; Apply  topically to the appropriate area as directed Daily.  Dispense: 60 g; Refill: 0    2. Elevated blood pressure reading in office without diagnosis of hypertension  Comments:  Monitor at home. Return if >140/90 consistently.             Follow Up     No follow-ups on file.  Patient was given instructions and counseling regarding her condition or for health maintenance advice. Please see specific information pulled into the AVS if appropriate.

## 2024-05-24 DIAGNOSIS — G43.009 MIGRAINE WITHOUT AURA AND WITHOUT STATUS MIGRAINOSUS, NOT INTRACTABLE: ICD-10-CM

## 2024-05-24 RX ORDER — ZOLMITRIPTAN 5 MG/1
5 TABLET, FILM COATED ORAL ONCE
Qty: 12 TABLET | Refills: 2 | Status: SHIPPED | OUTPATIENT
Start: 2024-05-24 | End: 2024-05-24

## 2024-05-24 NOTE — TELEPHONE ENCOUNTER
Caller: Berumen Palak JOSE MIGUEL    Relationship: Self    Best call back number: [phone]     Requested Prescriptions:   Requested Prescriptions     Pending Prescriptions Disp Refills    ZOLMitriptan (ZOMIG) 5 MG tablet 12 tablet 2     Sig: Take 1 tablet by mouth 1 (One) Time for 1 dose. AT ONSET OF HEADACHE MAY REPEAT ONE TABLET IN 2 HOURS IF NEEDED        Pharmacy where request should be sent: [facility] [id number] [facility]  AT [place] [phone] [facility] [phone]      Last office visit with prescribing clinician: 3/25/2024   Last telemedicine visit with prescribing clinician: Visit date not found   Next office visit with prescribing clinician: 10/16/2024     Additional details provided by patient: PHARMACY CLAIMS THEY HAVE BEEN CONTACTING DR. POWER TEAM WITH NO RESPONSE.    PATIENT REQUESTS DR. POWER'S TEAM CONTACT PATIENT WITH ANY UPDATES OR IF THERE IS AN ISSUE FULFILLING THIS REQUEST. (HUB CANNOT MAKE OUTBOUND CALLS, DO NOT ROUTE TO HUB)    Mike Arenas Rep   05/24/24 14:50 EDT

## 2024-09-27 DIAGNOSIS — R73.9 ELEVATED BLOOD SUGAR LEVEL: ICD-10-CM

## 2024-09-27 DIAGNOSIS — Z13.0 SCREENING FOR DEFICIENCY ANEMIA: ICD-10-CM

## 2024-09-27 DIAGNOSIS — Z13.6 SCREENING FOR ISCHEMIC HEART DISEASE: ICD-10-CM

## 2024-09-27 DIAGNOSIS — Z00.00 MEDICARE ANNUAL WELLNESS VISIT, SUBSEQUENT: Primary | ICD-10-CM

## 2024-10-16 ENCOUNTER — OFFICE VISIT (OUTPATIENT)
Dept: FAMILY MEDICINE CLINIC | Facility: CLINIC | Age: 74
End: 2024-10-16
Payer: MEDICARE

## 2024-10-16 VITALS
SYSTOLIC BLOOD PRESSURE: 148 MMHG | OXYGEN SATURATION: 99 % | BODY MASS INDEX: 18.95 KG/M2 | WEIGHT: 111 LBS | DIASTOLIC BLOOD PRESSURE: 80 MMHG | TEMPERATURE: 96.8 F | HEART RATE: 86 BPM | HEIGHT: 64 IN

## 2024-10-16 DIAGNOSIS — Z00.00 MEDICARE ANNUAL WELLNESS VISIT, SUBSEQUENT: Primary | ICD-10-CM

## 2024-10-16 PROCEDURE — 1159F MED LIST DOCD IN RCRD: CPT | Performed by: STUDENT IN AN ORGANIZED HEALTH CARE EDUCATION/TRAINING PROGRAM

## 2024-10-16 PROCEDURE — 1160F RVW MEDS BY RX/DR IN RCRD: CPT | Performed by: STUDENT IN AN ORGANIZED HEALTH CARE EDUCATION/TRAINING PROGRAM

## 2024-10-16 PROCEDURE — 1126F AMNT PAIN NOTED NONE PRSNT: CPT | Performed by: STUDENT IN AN ORGANIZED HEALTH CARE EDUCATION/TRAINING PROGRAM

## 2024-10-16 PROCEDURE — G0439 PPPS, SUBSEQ VISIT: HCPCS | Performed by: STUDENT IN AN ORGANIZED HEALTH CARE EDUCATION/TRAINING PROGRAM

## 2024-10-16 PROCEDURE — 1170F FXNL STATUS ASSESSED: CPT | Performed by: STUDENT IN AN ORGANIZED HEALTH CARE EDUCATION/TRAINING PROGRAM

## 2024-10-16 RX ORDER — DOXYCYCLINE 100 MG/1
100 CAPSULE ORAL 2 TIMES DAILY
Qty: 10 CAPSULE | Refills: 0 | Status: SHIPPED | OUTPATIENT
Start: 2024-10-16

## 2024-10-16 NOTE — PROGRESS NOTES
Subjective   The ABCs of the Annual Wellness Visit  Medicare Wellness Visit      Palak Berumen is a 74 y.o. patient who presents for a Medicare Wellness Visit.    The following portions of the patient's history were reviewed and   updated as appropriate: allergies, current medications, past family history, past medical history, past social history, past surgical history, and problem list.    Compared to one year ago, the patient's physical   health is the same.  Compared to one year ago, the patient's mental   health is the same.    Recent Hospitalizations:  She was not admitted to the hospital during the last year.     Current Medical Providers:  Patient Care Team:  Rebecca Fernández MD as PCP - General (Family Medicine)  Brenton Zamora MD as Consulting Physician (Medical Oncology)  Marycarmen Ortega MD as Consulting Physician (Obstetrics and Gynecology)    Outpatient Medications Prior to Visit   Medication Sig Dispense Refill    Ascorbic Acid (VITAMIN C) 100 MG tablet Take 1 tablet by mouth Daily.      Calcium Carbonate-Vitamin D (CALCIUM-VITAMIN D) 600-200 MG-UNIT capsule Take 1 capsule by mouth Daily.      clobetasol (TEMOVATE) 0.05 % ointment       CRANBERRY PO Take 1 each by mouth Daily.      Cyanocobalamin (VITAMIN B-12 PO) Take 1 tablet by mouth Daily.      D-Mannose 500 MG capsule Take 1 capsule by mouth Daily.      estradiol (ESTRACE) 0.1 MG/GM vaginal cream Insert  into the vagina 2 (Two) Times a Week.      fluticasone (Flonase) 50 MCG/ACT nasal spray 2 sprays into the nostril(s) as directed by provider Daily. 15.8 mL 3    Multiple Vitamin (MULTIVITAMIN) tablet Take 1 tablet by mouth Daily.      Omega-3 Fatty Acids (fish oil) 1000 MG capsule capsule Take  by mouth.      ZOLMitriptan (ZOMIG) 5 MG tablet Take 1 tablet by mouth 1 (One) Time for 1 dose. AT ONSET OF HEADACHE MAY REPEAT ONE TABLET IN 2 HOURS IF NEEDED 12 tablet 2    metroNIDAZOLE (Metrogel) 1 % gel Apply  topically to the appropriate  "area as directed Daily. (Patient not taking: Reported on 10/16/2024) 60 g 0     No facility-administered medications prior to visit.     No opioid medication identified on active medication list. I have reviewed chart for other potential  high risk medication/s and harmful drug interactions in the elderly.      Aspirin is not on active medication list.  Aspirin use is not indicated based on review of current medical condition/s. Risk of harm outweighs potential benefits.  .    Patient Active Problem List   Diagnosis    Chronic lymphocytic leukemia    Atypical migraine    Osteopenia    Bladder prolapse, female, acquired    Medicare annual wellness visit, subsequent    Family history of colon cancer    History of adenomatous polyp of colon     Advance Care Planning Advance Directive is not on file.  ACP discussion was held with the patient during this visit. Patient has an advance directive (not in EMR), copy requested.            Objective   Vitals:    10/16/24 0937   BP: 148/80   Pulse: 86   Temp: 96.8 °F (36 °C)   SpO2: 99%   Weight: 50.3 kg (111 lb)   Height: 162.6 cm (64\")   PainSc: 0-No pain       Estimated body mass index is 19.05 kg/m² as calculated from the following:    Height as of this encounter: 162.6 cm (64\").    Weight as of this encounter: 50.3 kg (111 lb).    BMI is within normal parameters. No other follow-up for BMI required.       Does the patient have evidence of cognitive impairment? No  Lab Results   Component Value Date    CHLPL 197 10/02/2024    TRIG 69 10/02/2024    HDL 92 (H) 10/02/2024    LDL 92 10/02/2024    VLDL 13 10/02/2024    HGBA1C 5.90 (H) 10/02/2024                                                                                               Health  Risk Assessment    Smoking Status:  Social History     Tobacco Use   Smoking Status Never    Passive exposure: Never   Smokeless Tobacco Never     Alcohol Consumption:  Social History     Substance and Sexual Activity   Alcohol Use Not " Currently    Comment: rare       Fall Risk Screen  GEEADI Fall Risk Assessment was completed, and patient is at LOW risk for falls.Assessment completed on:10/16/2024    Depression Screening:      10/16/2024     9:43 AM   PHQ-2/PHQ-9 Depression Screening   Little interest or pleasure in doing things Not at all   Feeling down, depressed, or hopeless Not at all     Health Habits and Functional and Cognitive Screening:      10/16/2024     9:42 AM   Functional & Cognitive Status   Do you have difficulty preparing food and eating? No   Do you have difficulty bathing yourself, getting dressed or grooming yourself? No   Do you have difficulty using the toilet? No   Do you have difficulty moving around from place to place? No   Do you have trouble with steps or getting out of a bed or a chair? No   Current Diet Well Balanced Diet   Dental Exam Up to date   Eye Exam Up to date   Exercise (times per week) 7 times per week   Current Exercises Include Walking;Weightlifting;Aerobics   Do you need help using the phone?  No   Are you deaf or do you have serious difficulty hearing?  No   Do you need help to go to places out of walking distance? No   Do you need help shopping? No   Do you need help preparing meals?  No   Do you need help with housework?  No   Do you need help with laundry? No   Do you need help taking your medications? No   Do you need help managing money? No   Do you ever drive or ride in a car without wearing a seat belt? No   Have you felt unusual stress, anger or loneliness in the last month? No   Who do you live with? Spouse   If you need help, do you have trouble finding someone available to you? No   Have you been bothered in the last four weeks by sexual problems? No   Do you have difficulty concentrating, remembering or making decisions? No           Age-appropriate Screening Schedule:  Refer to the list below for future screening recommendations based on patient's age, sex and/or medical conditions. Orders  for these recommended tests are listed in the plan section. The patient has been provided with a written plan.    Health Maintenance List  Health Maintenance   Topic Date Due    ZOSTER VACCINE (1 of 2) 01/04/2016    DXA SCAN  10/27/2016    INFLUENZA VACCINE  08/01/2024    COVID-19 Vaccine (8 - 2023-24 season) 09/26/2024    ANNUAL WELLNESS VISIT  10/11/2024    LIPID PANEL  10/02/2025    COLORECTAL CANCER SCREENING  03/11/2029    HEPATITIS C SCREENING  Completed    Pneumococcal Vaccine 65+  Completed    PAP SMEAR  Discontinued    TDAP/TD VACCINES  Discontinued                                                                                                                                                CMS Preventative Services Quick Reference  Risk Factors Identified During Encounter  Immunizations Discussed/Encouraged: Influenza, Shingrix, and COVID19    Dental Screening Recommended  Vision Screening Recommended    The above risks/problems have been discussed with the patient.  Pertinent information has been shared with the patient in the After Visit Summary.  An After Visit Summary and PPPS were made available to the patient.    Follow Up:   Next Medicare Wellness visit to be scheduled in 1 year.     Assessment & Plan  Medicare annual wellness visit, subsequent  Colonoscopy: last 2024, brother with colon cancer - repeat 5yrs  LDCT: never smoker  Mammogram: N/A, bilateral mastecomy  DEXA: osteoporosis previously, due for repeat. Prefers to not treat with medication.    Immunizations: eligible for Shingrex (had Zostavax)  Labs: reviewed today  The 10-year ASCVD risk score (Tayla GARCIA, et al., 2019) is: 18.5%    Values used to calculate the score:      Age: 74 years      Sex: Female      Is Non- : No      Diabetic: No      Tobacco smoker: No      Systolic Blood Pressure: 148 mmHg      Is BP treated: No      HDL Cholesterol: 92 mg/dL      Total Cholesterol: 197 mg/dL      Patient was counseled in  regards to maintaining a healthy lifestyle, rich in whole grains, fruits and vegetables. Limit high saturated fats and processed sugars. Maintain an active lifestyle to promote overall health and well being.        New Medications Ordered This Visit   Medications    doxycycline (VIBRAMYCIN) 100 MG capsule     Sig: Take 1 capsule by mouth 2 (Two) Times a Day.     Dispense:  10 capsule     Refill:  0     Blood pressure is elevated today.  This is likely due to decongestants that she is taking for upper respiratory infection.  Symptoms have been persistent for a little over 2 weeks.  She did see urgent care who recommended Allegra.  She primarily describes facial congestion, left ear pain.  No cough.  No fever.  No shortness of breath.  Will treat with antibiotics and recommend starting Flonase.  Avoid decongestants but okay to do Mucinex.    Follow Up:   Return in about 1 year (around 10/16/2025) for Medicare Wellness.

## 2024-10-16 NOTE — ASSESSMENT & PLAN NOTE
Colonoscopy: last 2024, brother with colon cancer - repeat 5yrs  LDCT: never smoker  Mammogram: N/A, bilateral mastecomy  DEXA: osteoporosis previously, due for repeat. Prefers to not treat with medication.    Immunizations: eligible for Shingrex (had Zostavax)  Labs: reviewed today  The 10-year ASCVD risk score (Tayla GARCIA, et al., 2019) is: 18.5%    Values used to calculate the score:      Age: 74 years      Sex: Female      Is Non- : No      Diabetic: No      Tobacco smoker: No      Systolic Blood Pressure: 148 mmHg      Is BP treated: No      HDL Cholesterol: 92 mg/dL      Total Cholesterol: 197 mg/dL      Patient was counseled in regards to maintaining a healthy lifestyle, rich in whole grains, fruits and vegetables. Limit high saturated fats and processed sugars. Maintain an active lifestyle to promote overall health and well being.

## 2024-12-11 DIAGNOSIS — L71.9 ROSACEA: ICD-10-CM

## 2024-12-11 RX ORDER — METRONIDAZOLE 10 MG/G
GEL TOPICAL
Qty: 60 G | Refills: 0 | Status: SHIPPED | OUTPATIENT
Start: 2024-12-11 | End: 2024-12-13

## 2024-12-11 NOTE — TELEPHONE ENCOUNTER
Rx Refill Note  Requested Prescriptions     Pending Prescriptions Disp Refills    metroNIDAZOLE (METROGEL) 1 % gel [Pharmacy Med Name: metroNIDAZOLE TOPICAL 1% GEL] 60 g 0     Sig: APPLY TO AFFECTED AREA(S) AS DIRECTED DAILY      Last office visit with prescribing clinician: 10/16/2024   Last telemedicine visit with prescribing clinician: Visit date not found   Next office visit with prescribing clinician: 10/27/2025                         Would you like a call back once the refill request has been completed: [] Yes [] No    If the office needs to give you a call back, can they leave a voicemail: [] Yes [] No    Jatinder Lange MA  12/11/24, 15:37 EST

## 2024-12-13 DIAGNOSIS — L71.9 ROSACEA: ICD-10-CM

## 2024-12-13 RX ORDER — METRONIDAZOLE 10 MG/G
GEL TOPICAL
Qty: 60 G | Refills: 0 | Status: SHIPPED | OUTPATIENT
Start: 2024-12-13

## 2025-06-09 ENCOUNTER — OFFICE VISIT (OUTPATIENT)
Dept: FAMILY MEDICINE CLINIC | Facility: CLINIC | Age: 75
End: 2025-06-09
Payer: MEDICARE

## 2025-06-09 VITALS
TEMPERATURE: 95.1 F | WEIGHT: 109.2 LBS | DIASTOLIC BLOOD PRESSURE: 68 MMHG | SYSTOLIC BLOOD PRESSURE: 166 MMHG | BODY MASS INDEX: 18.64 KG/M2 | HEIGHT: 64 IN | HEART RATE: 91 BPM | OXYGEN SATURATION: 98 %

## 2025-06-09 DIAGNOSIS — R39.89 SUSPECTED UTI: ICD-10-CM

## 2025-06-09 DIAGNOSIS — N30.01 ACUTE CYSTITIS WITH HEMATURIA: Primary | ICD-10-CM

## 2025-06-09 DIAGNOSIS — H65.02 NON-RECURRENT ACUTE SEROUS OTITIS MEDIA OF LEFT EAR: ICD-10-CM

## 2025-06-09 LAB
BILIRUB BLD-MCNC: NEGATIVE MG/DL
CLARITY, POC: ABNORMAL
COLOR UR: ABNORMAL
EXPIRATION DATE: ABNORMAL
GLUCOSE UR STRIP-MCNC: NEGATIVE MG/DL
KETONES UR QL: NEGATIVE
LEUKOCYTE EST, POC: ABNORMAL
Lab: ABNORMAL
NITRITE UR-MCNC: NEGATIVE MG/ML
PH UR: 7 [PH] (ref 5–8)
PROT UR STRIP-MCNC: ABNORMAL MG/DL
RBC # UR STRIP: ABNORMAL /UL
SP GR UR: 1.02 (ref 1–1.03)
UROBILINOGEN UR QL: NORMAL

## 2025-06-09 PROCEDURE — 1160F RVW MEDS BY RX/DR IN RCRD: CPT | Performed by: NURSE PRACTITIONER

## 2025-06-09 PROCEDURE — 81003 URINALYSIS AUTO W/O SCOPE: CPT | Performed by: NURSE PRACTITIONER

## 2025-06-09 PROCEDURE — 1125F AMNT PAIN NOTED PAIN PRSNT: CPT | Performed by: NURSE PRACTITIONER

## 2025-06-09 PROCEDURE — 99213 OFFICE O/P EST LOW 20 MIN: CPT | Performed by: NURSE PRACTITIONER

## 2025-06-09 PROCEDURE — 1159F MED LIST DOCD IN RCRD: CPT | Performed by: NURSE PRACTITIONER

## 2025-06-09 NOTE — PROGRESS NOTES
"Chief Complaint  Urinary Tract Infection (Pt reports painful urination and hematuria sudden onset this morning. Denies fever, abd pain, flank pain. ) and Earache (Intermittent L ear pain and sinus pressure x1W Pt rates pain 5/10 Started Flonase and Mucinex Sinus Max)    Subjective        Palak Berumen presents to [facility]  Urinary Tract Infection  Chronicity:  New  Onset:  Today  Fever:  No fever  Associated symptoms: hematuria    Associated symptoms: no flank pain, no frequency, no hesitancy and no urgency    Treatments tried:  Increased fluids  PMH includes: no kidney stones    PMH includes comment:  Prolapsed uterus  Earache  Affected ear:  Left  Episode onset: 1 week ago.  Fever:  No fever  Associated symptoms: dizziness and congestion    Associated symptoms: no cough, no headaches, no rhinorrhea and no sore throat    Associated symptoms comment:  Sinus pressure, tenderness   Treatments tried: Flonase, Mucinex, Sinus Max.  Improvement on treatment:  Moderate  PMH includes comment:  Prolapsed uterus      Objective   Vital Signs:  /68   Pulse 91   Temp 95.1 °F (35.1 °C)   Ht 162.6 cm (64.02\")   Wt 49.5 kg (109 lb 3.2 oz)   SpO2 98%   BMI 18.73 kg/m²   Estimated body mass index is 18.73 kg/m² as calculated from the following:    Height as of this encounter: 162.6 cm (64.02\").    Weight as of this encounter: 49.5 kg (109 lb 3.2 oz).    BMI is within normal parameters. No other follow-up for BMI required.      Physical Exam  Vitals reviewed.   Constitutional:       General: She is not in acute distress.     Appearance: Normal appearance. She is not ill-appearing, toxic-appearing or diaphoretic.   HENT:      Head: Normocephalic and atraumatic.      Left Ear: A middle ear effusion is present. Tympanic membrane is not injected, scarred, perforated, erythematous, retracted or bulging.   Pulmonary:      Effort: Pulmonary effort is normal. No respiratory distress. "   Neurological:      General: No focal deficit present.      Mental Status: She is alert and oriented to person, place, and time.      Motor: No weakness.      Gait: Gait normal.   Psychiatric:         Mood and Affect: Mood normal.         Behavior: Behavior normal.        Result Review :         Brief Urine Lab Results  (Last result in the past 365 days)        Color   Clarity   Blood   Leuk Est   Nitrite   Protein   CREAT   Urine HCG        06/09/25 0920 Red   Slightly Cloudy   Large   Large (3+)   Negative   100 mg/dL                         Assessment and Plan   Diagnoses and all orders for this visit:    1. Acute cystitis with hematuria (Primary)  -     amoxicillin-clavulanate (AUGMENTIN) 875-125 MG per tablet; Take 1 tablet by mouth 2 (Two) Times a Day for 7 days.  Dispense: 14 tablet; Refill: 0    2. Suspected UTI  -     POCT urinalysis dipstick, automated  -     Urine Culture - Urine, Urine, Clean Catch    3. Non-recurrent acute serous otitis media of left ear      Urinalysis positive for blood and leukocytes.  Will send urine for culture.  Augmentin prescribed.  No acute inner ear infection.  Recommended Flonase and starting daily allergy medication.  Patient's blood pressure is elevated in the office.  Elevated at last visit as well.  Recommended monitoring at home.  If readings consistently above 140/80, recommended sooner appointment with PCP.         Follow Up   Return if symptoms worsen or fail to improve.  Patient was given instructions and counseling regarding her condition or for health maintenance advice. Please see specific information pulled into the AVS if appropriate.         Electronically signed by CHAYO Tafoya, 06/09/25, 9:34 AM EDT.

## 2025-06-12 LAB
BACTERIA UR CULT: ABNORMAL
BACTERIA UR CULT: ABNORMAL
OTHER ANTIBIOTIC SUSC ISLT: ABNORMAL

## 2025-06-17 DIAGNOSIS — G43.009 MIGRAINE WITHOUT AURA AND WITHOUT STATUS MIGRAINOSUS, NOT INTRACTABLE: ICD-10-CM

## 2025-06-17 RX ORDER — ZOLMITRIPTAN 5 MG/1
TABLET, FILM COATED ORAL
Qty: 9 TABLET | Refills: 1 | Status: SHIPPED | OUTPATIENT
Start: 2025-06-17

## 2025-08-08 DIAGNOSIS — J30.2 SEASONAL ALLERGIC RHINITIS, UNSPECIFIED TRIGGER: ICD-10-CM

## 2025-08-08 RX ORDER — FLUTICASONE PROPIONATE 50 MCG
2 SPRAY, SUSPENSION (ML) NASAL DAILY
Qty: 16 G | Refills: 1 | Status: SHIPPED | OUTPATIENT
Start: 2025-08-08

## (undated) DEVICE — ADAPT CLN BIOGUARD AIR/H2O DISP

## (undated) DEVICE — TUBING, SUCTION, 1/4" X 10', STRAIGHT: Brand: MEDLINE

## (undated) DEVICE — SENSR O2 OXIMAX FNGR A/ 18IN NONSTR

## (undated) DEVICE — THE TORRENT IRRIGATION SCOPE CONNECTOR IS USED WITH THE TORRENT IRRIGATION TUBING TO PROVIDE IRRIGATION FLUIDS SUCH AS STERILE WATER DURING GASTROINTESTINAL ENDOSCOPIC PROCEDURES WHEN USED IN CONJUNCTION WITH AN IRRIGATION PUMP (OR ELECTROSURGICAL UNIT).: Brand: TORRENT

## (undated) DEVICE — KT ORCA ORCAPOD DISP STRL

## (undated) DEVICE — SINGLE-USE BIOPSY FORCEPS: Brand: RADIAL JAW 4

## (undated) DEVICE — Device: Brand: DEFENDO AIR/WATER/SUCTION AND BIOPSY VALVE

## (undated) DEVICE — LN SMPL CO2 SHTRM SD STREAM W/M LUER

## (undated) DEVICE — CANN O2 ETCO2 FITS ALL CONN CO2 SMPL A/ 7IN DISP LF

## (undated) DEVICE — CANN NASL CO2 TRULINK W/O2 A/